# Patient Record
Sex: MALE | Race: WHITE | NOT HISPANIC OR LATINO | Employment: OTHER | URBAN - METROPOLITAN AREA
[De-identification: names, ages, dates, MRNs, and addresses within clinical notes are randomized per-mention and may not be internally consistent; named-entity substitution may affect disease eponyms.]

---

## 2017-07-27 ENCOUNTER — HOSPITAL ENCOUNTER (OUTPATIENT)
Dept: RADIOLOGY | Facility: MEDICAL CENTER | Age: 77
End: 2017-07-27
Attending: CLINICAL NURSE SPECIALIST
Payer: MEDICARE

## 2017-07-27 DIAGNOSIS — M54.16 LUMBAR RADICULOPATHY: ICD-10-CM

## 2017-07-27 PROCEDURE — 72110 X-RAY EXAM L-2 SPINE 4/>VWS: CPT

## 2017-07-27 PROCEDURE — 72148 MRI LUMBAR SPINE W/O DYE: CPT

## 2017-09-05 ENCOUNTER — APPOINTMENT (OUTPATIENT)
Dept: RADIOLOGY | Facility: MEDICAL CENTER | Age: 77
End: 2017-09-05
Attending: NEUROLOGICAL SURGERY
Payer: MEDICARE

## 2017-09-05 DIAGNOSIS — G60.9 HEREDITARY PERIPHERAL NEUROPATHY(356.0): ICD-10-CM

## 2017-09-05 PROCEDURE — 72146 MRI CHEST SPINE W/O DYE: CPT

## 2017-09-05 PROCEDURE — 72141 MRI NECK SPINE W/O DYE: CPT

## 2017-10-05 ENCOUNTER — HOSPITAL ENCOUNTER (OUTPATIENT)
Dept: LAB | Facility: MEDICAL CENTER | Age: 77
End: 2017-10-05
Attending: PSYCHIATRY & NEUROLOGY
Payer: MEDICARE

## 2017-10-05 LAB
ALBUMIN SERPL BCP-MCNC: 3.9 G/DL (ref 3.2–4.9)
ALBUMIN/GLOB SERPL: 1.3 G/DL
ALP SERPL-CCNC: 40 U/L (ref 30–99)
ALT SERPL-CCNC: 12 U/L (ref 2–50)
ANION GAP SERPL CALC-SCNC: 7 MMOL/L (ref 0–11.9)
AST SERPL-CCNC: 17 U/L (ref 12–45)
BASOPHILS # BLD AUTO: 1.2 % (ref 0–1.8)
BASOPHILS # BLD: 0.06 K/UL (ref 0–0.12)
BILIRUB SERPL-MCNC: 1 MG/DL (ref 0.1–1.5)
BUN SERPL-MCNC: 16 MG/DL (ref 8–22)
CALCIUM SERPL-MCNC: 9.2 MG/DL (ref 8.5–10.5)
CHLORIDE SERPL-SCNC: 104 MMOL/L (ref 96–112)
CK SERPL-CCNC: 127 U/L (ref 0–154)
CO2 SERPL-SCNC: 26 MMOL/L (ref 20–33)
CREAT SERPL-MCNC: 1.2 MG/DL (ref 0.5–1.4)
EOSINOPHIL # BLD AUTO: 0.2 K/UL (ref 0–0.51)
EOSINOPHIL NFR BLD: 3.9 % (ref 0–6.9)
ERYTHROCYTE [DISTWIDTH] IN BLOOD BY AUTOMATED COUNT: 42.1 FL (ref 35.9–50)
ERYTHROCYTE [SEDIMENTATION RATE] IN BLOOD BY WESTERGREN METHOD: 12 MM/HOUR (ref 0–20)
FOLATE SERPL-MCNC: >23.2 NG/ML
GFR SERPL CREATININE-BSD FRML MDRD: 58 ML/MIN/1.73 M 2
GLOBULIN SER CALC-MCNC: 3 G/DL (ref 1.9–3.5)
GLUCOSE SERPL-MCNC: 84 MG/DL (ref 65–99)
HCT VFR BLD AUTO: 39.8 % (ref 42–52)
HGB BLD-MCNC: 14 G/DL (ref 14–18)
IMM GRANULOCYTES # BLD AUTO: 0.01 K/UL (ref 0–0.11)
IMM GRANULOCYTES NFR BLD AUTO: 0.2 % (ref 0–0.9)
LYMPHOCYTES # BLD AUTO: 1.73 K/UL (ref 1–4.8)
LYMPHOCYTES NFR BLD: 33.3 % (ref 22–41)
MCH RBC QN AUTO: 32 PG (ref 27–33)
MCHC RBC AUTO-ENTMCNC: 35.2 G/DL (ref 33.7–35.3)
MCV RBC AUTO: 91.1 FL (ref 81.4–97.8)
MONOCYTES # BLD AUTO: 0.53 K/UL (ref 0–0.85)
MONOCYTES NFR BLD AUTO: 10.2 % (ref 0–13.4)
NEUTROPHILS # BLD AUTO: 2.66 K/UL (ref 1.82–7.42)
NEUTROPHILS NFR BLD: 51.2 % (ref 44–72)
NRBC # BLD AUTO: 0 K/UL
NRBC BLD AUTO-RTO: 0 /100 WBC
PLATELET # BLD AUTO: 236 K/UL (ref 164–446)
PMV BLD AUTO: 9.7 FL (ref 9–12.9)
POTASSIUM SERPL-SCNC: 4.4 MMOL/L (ref 3.6–5.5)
PROT SERPL-MCNC: 6.9 G/DL (ref 6–8.2)
RBC # BLD AUTO: 4.37 M/UL (ref 4.7–6.1)
RHEUMATOID FACT SER IA-ACNC: <10 IU/ML (ref 0–14)
SODIUM SERPL-SCNC: 137 MMOL/L (ref 135–145)
TREPONEMA PALLIDUM IGG+IGM AB [PRESENCE] IN SERUM OR PLASMA BY IMMUNOASSAY: NON REACTIVE
URATE SERPL-MCNC: 5.4 MG/DL (ref 2.5–8.3)
VIT B12 SERPL-MCNC: 499 PG/ML (ref 211–911)
WBC # BLD AUTO: 5.2 K/UL (ref 4.8–10.8)

## 2017-10-05 PROCEDURE — 82784 ASSAY IGA/IGD/IGG/IGM EACH: CPT

## 2017-10-05 PROCEDURE — 85025 COMPLETE CBC W/AUTO DIFF WBC: CPT

## 2017-10-05 PROCEDURE — 82550 ASSAY OF CK (CPK): CPT

## 2017-10-05 PROCEDURE — 86038 ANTINUCLEAR ANTIBODIES: CPT

## 2017-10-05 PROCEDURE — 86780 TREPONEMA PALLIDUM: CPT | Mod: GA

## 2017-10-05 PROCEDURE — 80053 COMPREHEN METABOLIC PANEL: CPT

## 2017-10-05 PROCEDURE — 82607 VITAMIN B-12: CPT

## 2017-10-05 PROCEDURE — 83519 RIA NONANTIBODY: CPT

## 2017-10-05 PROCEDURE — 36415 COLL VENOUS BLD VENIPUNCTURE: CPT

## 2017-10-05 PROCEDURE — 84160 ASSAY OF PROTEIN ANY SOURCE: CPT

## 2017-10-05 PROCEDURE — 84550 ASSAY OF BLOOD/URIC ACID: CPT

## 2017-10-05 PROCEDURE — 85652 RBC SED RATE AUTOMATED: CPT

## 2017-10-05 PROCEDURE — 84156 ASSAY OF PROTEIN URINE: CPT

## 2017-10-05 PROCEDURE — 86039 ANTINUCLEAR ANTIBODIES (ANA): CPT

## 2017-10-05 PROCEDURE — 86334 IMMUNOFIX E-PHORESIS SERUM: CPT

## 2017-10-05 PROCEDURE — 84165 PROTEIN E-PHORESIS SERUM: CPT

## 2017-10-05 PROCEDURE — 86431 RHEUMATOID FACTOR QUANT: CPT

## 2017-10-05 PROCEDURE — 82746 ASSAY OF FOLIC ACID SERUM: CPT

## 2017-10-05 PROCEDURE — 83516 IMMUNOASSAY NONANTIBODY: CPT

## 2017-10-05 PROCEDURE — 82085 ASSAY OF ALDOLASE: CPT

## 2017-10-06 LAB — ALDOLASE SERPL-CCNC: 2.7 U/L (ref 1.5–8.1)

## 2017-10-07 LAB
NUCLEAR IGG SER QL IA: DETECTED
NUCLEAR IGG TITR SER IF: ABNORMAL {TITER}

## 2017-10-08 LAB
ALBUMIN SERPL-MCNC: 4.11 G/DL (ref 3.75–5.01)
ALPHA1 GLOB SERPL ELPH-MCNC: 0.26 G/DL (ref 0.19–0.46)
ALPHA2 GLOB SERPL ELPH-MCNC: 0.66 G/DL (ref 0.48–1.05)
B-GLOBULIN SERPL ELPH-MCNC: 0.84 G/DL (ref 0.48–1.1)
GAMMA GLOB SERPL ELPH-MCNC: 1.22 G/DL (ref 0.62–1.51)
IGA SERPL-MCNC: 269 MG/DL (ref 68–408)
IGG SERPL-MCNC: 1170 MG/DL (ref 768–1632)
IGM SERPL-MCNC: 78 MG/DL (ref 35–263)
INTERPRETATION SERPL IFE-IMP: NORMAL
INTERPRETATION SERPL IFE-IMP: NORMAL
PATHOLOGY STUDY: NORMAL
PROT SERPL-MCNC: 7.1 G/DL (ref 6–8.3)
PROT UR-MCNC: <4 MG/DL (ref 0–15)

## 2017-10-16 LAB
ACHR BIND AB SER-SCNC: 0 NMOL/L (ref 0–0.4)
ACHR BLOCK AB/ACHR TOTAL SFR SER: 8 % (ref 0–26)

## 2017-10-18 ENCOUNTER — HOSPITAL ENCOUNTER (OUTPATIENT)
Dept: RADIOLOGY | Facility: MEDICAL CENTER | Age: 77
End: 2017-10-18
Attending: OTOLARYNGOLOGY
Payer: MEDICARE

## 2017-10-18 DIAGNOSIS — R13.13 DYSPHAGIA, PHARYNGEAL PHASE: ICD-10-CM

## 2017-10-18 DIAGNOSIS — R49.0 DYSPHONIA: ICD-10-CM

## 2017-10-18 PROCEDURE — 74230 X-RAY XM SWLNG FUNCJ C+: CPT

## 2017-10-18 PROCEDURE — 92611 MOTION FLUOROSCOPY/SWALLOW: CPT

## 2017-10-18 PROCEDURE — G8996 SWALLOW CURRENT STATUS: HCPCS | Mod: CH

## 2017-10-18 PROCEDURE — G8997 SWALLOW GOAL STATUS: HCPCS | Mod: CH

## 2017-10-18 PROCEDURE — G8998 SWALLOW D/C STATUS: HCPCS | Mod: CH

## 2017-10-18 NOTE — PROGRESS NOTES
"[facility]  Speech-Language Pathology Department  Modified Barium Swallow Study Summary    Patient: Nathan Rose     Date of Evaluation:  10/18/2017    Referring MD: Jesus Miller M.D.     Radiologist:  Dr. Zhang    Patient History/Reason for Referral:  Pt notes long history of dysphagia and dysphonia.  He had an anterior cervical fusion and removal of cervical osteophytes about a year ago.  Pt notes improved swallow function since surgery however occasionally still chokes.  Pt notes there is \"no rhyme or reason\" for the choking.  He is on a regular diet and thin liquids.  Has some difficulty with large pills however is improving.     Procedure Results:  The examination was conducted with videofluoroscopy from a   Lateral and Anterior view.  The following textures were presented:   Applesauce, Diced Fruit, Cookie and Thin Liquid       The following observations were made:    Oral Phase Puree Thick Liquids Thin Liquids Mechanical Soft Solid   Anterior spillage        Residue in oral cavity after the swallow WFL N/A WFL WFL WFL   Decreased mastication        Loss of bolus control        Piecemeal deglutition        Slow oral transit time        Premature spillage into the valleculae        Premature spillage into the pyriform sinus           Other Observations:  Evidence of anterior cervical fusion around C4-5.  No osteophytes present.          Pharyngeal Phase Puree Thick Liquids Thin Liquids Mechanical Soft Solid   Delayed triggering of the pharyngeal swallow  N/A   WFL WFL   Absent initiation of swallow        Residue on tongue base        Residue on posterior pharyngeal wall min  min     Residue in valleculae min       Residue in pyriform sinus        Penetration        Aspiration          Other Observations: Residue clears easily with double swallow.  Slight decreased PPW stripping at area of surgery. Slight slow/sluggish epiglottal inversion however is complete.                         "     Esophageal Phase:  A-P view completed with puree and thin.  No overt deficits noted.                                 Impressions:  Oral-pharyngeal phase of swallow is within functional limits.  Slight posterior pharyngeal wall weakness at area of hardware from cervical fusion.  Residue clears easily with double swallow.  Slight slow/sluggish epiglottal inversion however is complete.         Recommendations: Diet: Regular        Liquid:  Thin                                        Medications:  As tolerated.  Floated whole in puree or with thick liquids may be more effective.         Compensatory Strategies:   1.  Double swallow as needed, especially with sticker foods   2.  Alternate bites/sips as needed   3.  Slow rate of intake            Medicare only:                     Thank you for the referral.    For further questions, please call [phone].        Irma Mohan MA, CCC-SLP   Speech-Language Pathologist

## 2017-10-24 NOTE — ADDENDUM NOTE
Encounter addended by: Katie Winters P.T. on: 10/24/2017 12:01 PM<BR>    Actions taken: Charge Capture section accepted

## 2018-03-26 ENCOUNTER — HOSPITAL ENCOUNTER (OUTPATIENT)
Dept: HOSPITAL 8 - CVU | Age: 78
Discharge: HOME | End: 2018-03-26
Attending: INTERNAL MEDICINE
Payer: MEDICARE

## 2018-03-26 DIAGNOSIS — R07.89: Primary | ICD-10-CM

## 2018-03-26 DIAGNOSIS — R06.02: ICD-10-CM

## 2018-03-26 PROCEDURE — 78452 HT MUSCLE IMAGE SPECT MULT: CPT

## 2018-03-26 PROCEDURE — A9502 TC99M TETROFOSMIN: HCPCS

## 2018-03-26 PROCEDURE — 93017 CV STRESS TEST TRACING ONLY: CPT

## 2018-03-26 PROCEDURE — 75571 CT HRT W/O DYE W/CA TEST: CPT

## 2018-03-26 PROCEDURE — 93306 TTE W/DOPPLER COMPLETE: CPT

## 2018-10-29 ENCOUNTER — APPOINTMENT (OUTPATIENT)
Dept: ADMISSIONS | Facility: MEDICAL CENTER | Age: 78
End: 2018-10-29
Attending: UROLOGY
Payer: MEDICARE

## 2018-10-29 DIAGNOSIS — Z01.810 PRE-OPERATIVE CARDIOVASCULAR EXAMINATION: ICD-10-CM

## 2018-10-29 DIAGNOSIS — Z01.812 PRE-OPERATIVE LABORATORY EXAMINATION: ICD-10-CM

## 2018-10-29 LAB
APPEARANCE UR: CLEAR
BILIRUB UR QL STRIP.AUTO: NEGATIVE
COLOR UR: YELLOW
EKG IMPRESSION: NORMAL
GLUCOSE UR STRIP.AUTO-MCNC: NEGATIVE MG/DL
KETONES UR STRIP.AUTO-MCNC: NEGATIVE MG/DL
LEUKOCYTE ESTERASE UR QL STRIP.AUTO: NEGATIVE
MICRO URNS: NORMAL
NITRITE UR QL STRIP.AUTO: NEGATIVE
PH UR STRIP.AUTO: 7 [PH]
PROT UR QL STRIP: NEGATIVE MG/DL
RBC UR QL AUTO: NEGATIVE
SP GR UR STRIP.AUTO: 1.01
UROBILINOGEN UR STRIP.AUTO-MCNC: 0.2 MG/DL

## 2018-10-29 PROCEDURE — 87086 URINE CULTURE/COLONY COUNT: CPT

## 2018-10-29 PROCEDURE — 81003 URINALYSIS AUTO W/O SCOPE: CPT

## 2018-10-31 LAB
BACTERIA UR CULT: NORMAL
SIGNIFICANT IND 70042: NORMAL
SITE SITE: NORMAL
SOURCE SOURCE: NORMAL

## 2018-11-08 ENCOUNTER — HOSPITAL ENCOUNTER (OUTPATIENT)
Facility: MEDICAL CENTER | Age: 78
End: 2018-11-09
Attending: UROLOGY | Admitting: UROLOGY
Payer: MEDICARE

## 2018-11-08 PROCEDURE — 160036 HCHG PACU - EA ADDL 30 MINS PHASE I: Performed by: UROLOGY

## 2018-11-08 PROCEDURE — 700101 HCHG RX REV CODE 250: Performed by: UROLOGY

## 2018-11-08 PROCEDURE — A9270 NON-COVERED ITEM OR SERVICE: HCPCS

## 2018-11-08 PROCEDURE — A9270 NON-COVERED ITEM OR SERVICE: HCPCS | Performed by: UROLOGY

## 2018-11-08 PROCEDURE — 700102 HCHG RX REV CODE 250 W/ 637 OVERRIDE(OP): Performed by: ANESTHESIOLOGY

## 2018-11-08 PROCEDURE — 700101 HCHG RX REV CODE 250

## 2018-11-08 PROCEDURE — 700102 HCHG RX REV CODE 250 W/ 637 OVERRIDE(OP): Performed by: UROLOGY

## 2018-11-08 PROCEDURE — 500879 HCHG PACK, CYSTO: Performed by: UROLOGY

## 2018-11-08 PROCEDURE — 700111 HCHG RX REV CODE 636 W/ 250 OVERRIDE (IP): Performed by: UROLOGY

## 2018-11-08 PROCEDURE — A9270 NON-COVERED ITEM OR SERVICE: HCPCS | Performed by: ANESTHESIOLOGY

## 2018-11-08 PROCEDURE — 700111 HCHG RX REV CODE 636 W/ 250 OVERRIDE (IP)

## 2018-11-08 PROCEDURE — A4346 CATH INDW FOLEY 3 WAY: HCPCS | Performed by: UROLOGY

## 2018-11-08 PROCEDURE — 160048 HCHG OR STATISTICAL LEVEL 1-5: Performed by: UROLOGY

## 2018-11-08 PROCEDURE — 501329 HCHG SET, CYSTO IRRIG Y TUR: Performed by: UROLOGY

## 2018-11-08 PROCEDURE — 160041 HCHG SURGERY MINUTES - EA ADDL 1 MIN LEVEL 4: Performed by: UROLOGY

## 2018-11-08 PROCEDURE — 700102 HCHG RX REV CODE 250 W/ 637 OVERRIDE(OP)

## 2018-11-08 PROCEDURE — 160009 HCHG ANES TIME/MIN: Performed by: UROLOGY

## 2018-11-08 PROCEDURE — 160002 HCHG RECOVERY MINUTES (STAT): Performed by: UROLOGY

## 2018-11-08 PROCEDURE — G0378 HOSPITAL OBSERVATION PER HR: HCPCS

## 2018-11-08 PROCEDURE — 160035 HCHG PACU - 1ST 60 MINS PHASE I: Performed by: UROLOGY

## 2018-11-08 PROCEDURE — A4357 BEDSIDE DRAINAGE BAG: HCPCS | Performed by: UROLOGY

## 2018-11-08 PROCEDURE — 160029 HCHG SURGERY MINUTES - 1ST 30 MINS LEVEL 4: Performed by: UROLOGY

## 2018-11-08 RX ORDER — DIPHENHYDRAMINE HCL 25 MG
25 TABLET ORAL EVERY 8 HOURS PRN
Status: DISCONTINUED | OUTPATIENT
Start: 2018-11-08 | End: 2018-11-08

## 2018-11-08 RX ORDER — HALOPERIDOL 5 MG/ML
1 INJECTION INTRAMUSCULAR
Status: DISCONTINUED | OUTPATIENT
Start: 2018-11-08 | End: 2018-11-08 | Stop reason: HOSPADM

## 2018-11-08 RX ORDER — OXYCODONE HYDROCHLORIDE 5 MG/1
2.5 TABLET ORAL
Status: DISCONTINUED | OUTPATIENT
Start: 2018-11-08 | End: 2018-11-09 | Stop reason: HOSPADM

## 2018-11-08 RX ORDER — ACETAMINOPHEN 500 MG
1000 TABLET ORAL ONCE
Status: COMPLETED | OUTPATIENT
Start: 2018-11-08 | End: 2018-11-08

## 2018-11-08 RX ORDER — DEXAMETHASONE SODIUM PHOSPHATE 4 MG/ML
4 INJECTION, SOLUTION INTRA-ARTICULAR; INTRALESIONAL; INTRAMUSCULAR; INTRAVENOUS; SOFT TISSUE
Status: DISCONTINUED | OUTPATIENT
Start: 2018-11-08 | End: 2018-11-09 | Stop reason: HOSPADM

## 2018-11-08 RX ORDER — DEXTROSE MONOHYDRATE, SODIUM CHLORIDE, AND POTASSIUM CHLORIDE 50; 1.49; 4.5 G/1000ML; G/1000ML; G/1000ML
INJECTION, SOLUTION INTRAVENOUS CONTINUOUS
Status: DISCONTINUED | OUTPATIENT
Start: 2018-11-08 | End: 2018-11-09 | Stop reason: HOSPADM

## 2018-11-08 RX ORDER — ONDANSETRON 2 MG/ML
4 INJECTION INTRAMUSCULAR; INTRAVENOUS EVERY 4 HOURS PRN
Status: DISCONTINUED | OUTPATIENT
Start: 2018-11-08 | End: 2018-11-09 | Stop reason: HOSPADM

## 2018-11-08 RX ORDER — ATROPA BELLADONNA AND OPIUM 16.2; 6 MG/1; MG/1
SUPPOSITORY RECTAL
Status: DISCONTINUED | OUTPATIENT
Start: 2018-11-08 | End: 2018-11-08 | Stop reason: HOSPADM

## 2018-11-08 RX ORDER — OMEPRAZOLE 20 MG/1
20 CAPSULE, DELAYED RELEASE ORAL DAILY
Status: DISCONTINUED | OUTPATIENT
Start: 2018-11-08 | End: 2018-11-09 | Stop reason: HOSPADM

## 2018-11-08 RX ORDER — MAGNESIUM HYDROXIDE 1200 MG/15ML
LIQUID ORAL
Status: COMPLETED | OUTPATIENT
Start: 2018-11-08 | End: 2018-11-08

## 2018-11-08 RX ORDER — TRAMADOL HYDROCHLORIDE 50 MG/1
50 TABLET ORAL
Status: DISCONTINUED | OUTPATIENT
Start: 2018-11-08 | End: 2018-11-08 | Stop reason: HOSPADM

## 2018-11-08 RX ORDER — SODIUM CHLORIDE, SODIUM LACTATE, POTASSIUM CHLORIDE, CALCIUM CHLORIDE 600; 310; 30; 20 MG/100ML; MG/100ML; MG/100ML; MG/100ML
INJECTION, SOLUTION INTRAVENOUS ONCE
Status: COMPLETED | OUTPATIENT
Start: 2018-11-08 | End: 2018-11-08

## 2018-11-08 RX ORDER — DIPHENHYDRAMINE HCL 25 MG
25 TABLET ORAL EVERY 6 HOURS PRN
Status: DISCONTINUED | OUTPATIENT
Start: 2018-11-08 | End: 2018-11-09 | Stop reason: HOSPADM

## 2018-11-08 RX ORDER — HALOPERIDOL 5 MG/ML
1 INJECTION INTRAMUSCULAR EVERY 6 HOURS PRN
Status: DISCONTINUED | OUTPATIENT
Start: 2018-11-08 | End: 2018-11-09 | Stop reason: HOSPADM

## 2018-11-08 RX ORDER — ACETAMINOPHEN 500 MG
1000 TABLET ORAL EVERY 6 HOURS
Status: DISCONTINUED | OUTPATIENT
Start: 2018-11-08 | End: 2018-11-09 | Stop reason: HOSPADM

## 2018-11-08 RX ORDER — MEPERIDINE HYDROCHLORIDE 25 MG/ML
12.5 INJECTION INTRAMUSCULAR; INTRAVENOUS; SUBCUTANEOUS
Status: DISCONTINUED | OUTPATIENT
Start: 2018-11-08 | End: 2018-11-08 | Stop reason: HOSPADM

## 2018-11-08 RX ORDER — CELECOXIB 200 MG/1
200 CAPSULE ORAL ONCE
Status: COMPLETED | OUTPATIENT
Start: 2018-11-08 | End: 2018-11-08

## 2018-11-08 RX ORDER — SIMVASTATIN 10 MG
20 TABLET ORAL NIGHTLY
Status: DISCONTINUED | OUTPATIENT
Start: 2018-11-08 | End: 2018-11-09 | Stop reason: HOSPADM

## 2018-11-08 RX ORDER — SCOLOPAMINE TRANSDERMAL SYSTEM 1 MG/1
1 PATCH, EXTENDED RELEASE TRANSDERMAL
Status: DISCONTINUED | OUTPATIENT
Start: 2018-11-08 | End: 2018-11-09 | Stop reason: HOSPADM

## 2018-11-08 RX ORDER — ONDANSETRON 2 MG/ML
4 INJECTION INTRAMUSCULAR; INTRAVENOUS
Status: DISCONTINUED | OUTPATIENT
Start: 2018-11-08 | End: 2018-11-08 | Stop reason: HOSPADM

## 2018-11-08 RX ORDER — SODIUM CHLORIDE, SODIUM LACTATE, POTASSIUM CHLORIDE, CALCIUM CHLORIDE 600; 310; 30; 20 MG/100ML; MG/100ML; MG/100ML; MG/100ML
INJECTION, SOLUTION INTRAVENOUS CONTINUOUS
Status: DISCONTINUED | OUTPATIENT
Start: 2018-11-08 | End: 2018-11-08 | Stop reason: HOSPADM

## 2018-11-08 RX ADMIN — POTASSIUM CHLORIDE, DEXTROSE MONOHYDRATE AND SODIUM CHLORIDE: 150; 5; 450 INJECTION, SOLUTION INTRAVENOUS at 16:38

## 2018-11-08 RX ADMIN — ACETAMINOPHEN 1000 MG: 500 TABLET ORAL at 16:54

## 2018-11-08 RX ADMIN — OMEPRAZOLE 20 MG: 20 CAPSULE, DELAYED RELEASE ORAL at 16:54

## 2018-11-08 RX ADMIN — LIDOCAINE HYDROCHLORIDE 0.5 ML: 10 INJECTION, SOLUTION EPIDURAL; INFILTRATION; INTRACAUDAL; PERINEURAL at 10:10

## 2018-11-08 RX ADMIN — SIMVASTATIN 20 MG: 10 TABLET, FILM COATED ORAL at 19:57

## 2018-11-08 RX ADMIN — SODIUM CHLORIDE, SODIUM LACTATE, POTASSIUM CHLORIDE, CALCIUM CHLORIDE: 600; 310; 30; 20 INJECTION, SOLUTION INTRAVENOUS at 10:10

## 2018-11-08 RX ADMIN — CELECOXIB 200 MG: 200 CAPSULE ORAL at 09:58

## 2018-11-08 RX ADMIN — ACETAMINOPHEN 1000 MG: 500 TABLET, FILM COATED ORAL at 09:58

## 2018-11-08 ASSESSMENT — COGNITIVE AND FUNCTIONAL STATUS - GENERAL
MOBILITY SCORE: 24
SUGGESTED CMS G CODE MODIFIER DAILY ACTIVITY: CH
DAILY ACTIVITIY SCORE: 24
SUGGESTED CMS G CODE MODIFIER MOBILITY: CH

## 2018-11-08 ASSESSMENT — LIFESTYLE VARIABLES
AVERAGE NUMBER OF DAYS PER WEEK YOU HAVE A DRINK CONTAINING ALCOHOL: 1
EVER FELT BAD OR GUILTY ABOUT YOUR DRINKING: NO
TOTAL SCORE: 0
EVER_SMOKED: NEVER
TOTAL SCORE: 0
CONSUMPTION TOTAL: NEGATIVE
EVER HAD A DRINK FIRST THING IN THE MORNING TO STEADY YOUR NERVES TO GET RID OF A HANGOVER: NO
ALCOHOL_USE: YES
HAVE PEOPLE ANNOYED YOU BY CRITICIZING YOUR DRINKING: NO
HAVE YOU EVER FELT YOU SHOULD CUT DOWN ON YOUR DRINKING: NO
TOTAL SCORE: 0
ON A TYPICAL DAY WHEN YOU DRINK ALCOHOL HOW MANY DRINKS DO YOU HAVE: 1
HOW MANY TIMES IN THE PAST YEAR HAVE YOU HAD 5 OR MORE DRINKS IN A DAY: 0

## 2018-11-08 ASSESSMENT — PAIN SCALES - GENERAL
PAINLEVEL_OUTOF10: 0

## 2018-11-08 ASSESSMENT — PATIENT HEALTH QUESTIONNAIRE - PHQ9
2. FEELING DOWN, DEPRESSED, IRRITABLE, OR HOPELESS: NOT AT ALL
1. LITTLE INTEREST OR PLEASURE IN DOING THINGS: NOT AT ALL
SUM OF ALL RESPONSES TO PHQ9 QUESTIONS 1 AND 2: 0

## 2018-11-08 NOTE — OR NURSING
Report given to LESLIE Kiran. Pt via ila, accompanied by transport, was transferred to [facility] at 1554. All personal belongings sent with Pt.

## 2018-11-08 NOTE — OP REPORT
DATE OF SERVICE:  11/08/2018    NAME OF OPERATION:  GreenLight photoselective vaporization of the prostate.    PREOPERATIVE DIAGNOSIS:  Benign prostatic hypertrophy with obstruction.    POSTOPERATIVE DIAGNOSIS:  Benign prostatic hypertrophy with obstruction.    PRIMARY SURGEON:  Grey Espinoza MD    ANESTHESIOLOGIST:  Binta Carmona MD    FINDINGS:  Trilobar especially median lobe hypertrophy of the prostate, 78 g.    INDICATIONS:  Briefly, the patient is a 78-year-old gentleman with a history   of severe obstructive urinary symptoms.  Upon consideration of options, he   elected to undergo a GreenLight PVP.  Informed consent has been obtained.    OPERATION IN DETAIL:  The patient was taken to the operating room, placed on   the operating table in supine position.  After administration of general   anesthesia, he was placed in lithotomy.  Genitals were prepped and draped   sterilely.  No sounds were used to calibrate the urethra to 28 Filipino without   difficulty.  A 22-Filipino cystoscope was then passed in the bladder with   visualizing obturator.  Prostatic urethra was completely obstructed.  In the   bladder, there was 1+ trabeculations.  Both ureteral orifices were identified   in the normal anatomic position, though somewhat close to the encroachment of   median lobe intravesically.    The laser bridge was employed and a GreenLight XPS fiber was utilized to   create a trough at the 5 and 7 o'clock position of the bladder neck back to   the level of the verumontanum using 80 forbes of energy.  This created   excellent isolation of the median lobe and the posterior capsular fibers were   identified to the level of the verumontanum moving retrograde to the bladder   neck.  With the median lobe and complete isolation, higher energy settings   were used to vaporize this down to the surgical capsule.    Trough was then created at the 10 o'clock position, the lateral lobe of the   right prostate beginning at  the bladder neck and back to the apex of the   prostate.  With this created to the lateral aspect of the capsule, the   resultant hanging lateral lobe was then completely vaporized down to what   appeared to be near the capsule.  The same was performed on the contralateral   side.  At the conclusion of this, there was a significant cavity created   through the entire prostate gland with a wide open bladder neck.  Spot cautery   was used to obtain better hemostasis.    Prostate laser debris was then irrigated free of the bladder.  At low pressure   filling, there was no evidence of significant bleeding as well as there was   maintained continue wide open prostatic fossa.  Lasering was not carried past   the verumontanum nor the ureteral orifices were involved.  Bladder was left   partially full and a 22-Uzbek 3-way catheter passed easily into the bladder   draining light pink irrigant at the case concluded.    He will be admitted overnight for observation and the use of continuous   bladder irrigation and for pain control.  Patient tolerated procedure well and   was taken to recovery room in stable condition.       ____________________________________     Grey Espinoza MD MCM / JOSH    DD:  11/08/2018 11:57:37  DT:  11/08/2018 12:12:34    D#:  [id number]  Job#:  [id number]

## 2018-11-08 NOTE — OR SURGEON
Immediate Post OP Note    PreOp Diagnosis: BPH with obstruction    PostOp Diagnosis: same    Procedure(s):  TURP-VAPOR - GREENLIGHT LASER - Wound Class: Clean Contaminated    Surgeon(s):  Grey sEpinoza M.D.    Anesthesiologist/Type of Anesthesia:  Anesthesiologist: Binta Carmona M.D./General    Surgical Staff:  Circulator: Christy Sheets R.N.; Guerline Singh R.N.  Relief Circulator: Armando Paulson R.N.  Relief Scrub: Khadijah Chandler  Scrub Person: Bk Kim C.N.A.; Brandon Chilel    Specimens removed if any:  * No specimens in log *    Estimated Blood Loss: min    Findings: trilobar, jeff median lobe, hypertrophy    Complications: none        11/8/2018 11:50 AM Grey Espinoza M.D.

## 2018-11-08 NOTE — OR NURSING
Pt AA/Ox4. VSS. Intact 3-way murray catheter with CBI. Pt denies pain or need for pain medications at this time. Pt denies numbness or tingling. No nausea or vomiting. SCDs in place. Pt's wife, Dara, updated. Awaiting room to be ready. Will continue to monitor closely.

## 2018-11-09 VITALS
TEMPERATURE: 97.9 F | RESPIRATION RATE: 16 BRPM | DIASTOLIC BLOOD PRESSURE: 69 MMHG | WEIGHT: 202.6 LBS | OXYGEN SATURATION: 97 % | SYSTOLIC BLOOD PRESSURE: 132 MMHG | HEIGHT: 77 IN | BODY MASS INDEX: 23.92 KG/M2 | HEART RATE: 59 BPM

## 2018-11-09 PROCEDURE — A9270 NON-COVERED ITEM OR SERVICE: HCPCS | Performed by: UROLOGY

## 2018-11-09 PROCEDURE — 700102 HCHG RX REV CODE 250 W/ 637 OVERRIDE(OP): Performed by: UROLOGY

## 2018-11-09 PROCEDURE — G0378 HOSPITAL OBSERVATION PER HR: HCPCS

## 2018-11-09 RX ADMIN — ACETAMINOPHEN 1000 MG: 500 TABLET ORAL at 09:01

## 2018-11-09 RX ADMIN — OMEPRAZOLE 20 MG: 20 CAPSULE, DELAYED RELEASE ORAL at 04:37

## 2018-11-09 ASSESSMENT — PAIN SCALES - GENERAL
PAINLEVEL_OUTOF10: 0

## 2018-11-09 NOTE — DISCHARGE INSTRUCTIONS
Discharge Instructions    Discharged to home by car with relative. Discharged via wheelchair, hospital escort: Yes.  Special equipment needed: Not Applicable    Be sure to schedule a follow-up appointment with your primary care doctor or any specialists as instructed.     Discharge Plan:   Diet Plan: Discussed  Activity Level: Discussed  Confirmed Follow up Appointment: Patient to Call and Schedule Appointment  Confirmed Symptoms Management: Discussed  Medication Reconciliation Updated: Yes  Pneumococcal Vaccine Administered/Refused: Not given - Patient refused pneumococcal vaccine  Influenza Vaccine Indication: Not indicated: Previously immunized this influenza season and > 8 years of age    I understand that a diet low in cholesterol, fat, and sodium is recommended for good health. Unless I have been given specific instructions below for another diet, I accept this instruction as my diet prescription.   Other diet: Regular    Special Instructions: None    · Is patient discharged on Warfarin / Coumadin?   No     Green Light Laser Prostate Treatment    Green light laser therapy is a procedure that uses a special high-energy laser for vaporizing extra prostate tissue. It is less invasive than traditional methods of prostate surgery, which involve cutting out the prostate tissue. Because the tissue is vaporized rather than cut out there is generally less blood loss.    LET YOUR HEALTH CARE PROVIDER KNOW ABOUT:  · Any allergies you have.  · Any medicines you are taking, including vitamins, herbs, eye drops, creams, and over-the-counter medication.  · Previous problems you or members of your family have had with the use of anesthetics.  · Any blood disorders you have.  · Previous surgeries you have had.  · Medical conditions you have.  ·   RISKS AND COMPLICATIONS  Generally, green light laser prostate treatment is a safe procedure. However, as with any procedure, complications can occur. Possible complications  include:  · Urinary tract infection.  · Erectile dysfunction (rare).  · Dry ejaculation--Semen is not released when you reach sexual climax.  · Scar tissue in the urinary passage.  ·   BEFORE THE PROCEDURE   · Your health care provider may discuss medicines you are taking and may advise you to stop taking specific ones.  · You may be given antibiotic medicine to take as a precaution against bacterial infection.  · Do not eat or drink anything for 8 hours before your procedure or as directed by your health care provider. You may have a sip of water to take any necessary medicines.  ·   PROCEDURE  Depending on the size and shape of your prostate, the procedure may take 30-60 minutes. You will be given one of the following:   · A medicine that makes you go to sleep (general anesthetic).  · A medicine injected into your spine that numbs your body below the waist (spinal anesthetic). Sedation is usually given with spinal anesthetic so you will be relaxed.  A tube containing viewing scopes and instruments will be inserted through your penis so that no cuts (incisions) are needed. A thin fiber is put through the tube and positioned next to the excess prostate tissue. Pulses of laser light come from the end of the fiber and are projected onto the excess tissue. The laser beam is absorbed by your blood, which becomes hot enough to vaporize the excess prostate tissue. This laser beam will seal off the blood vessels, decreasing bleeding. The tube with the viewing scopes, instruments, and thin fiber will be removed and replaced with a temporary catheter.    AFTER THE PROCEDURE   After the surgery, you will be sent to the recovery room for a short time. Depending on factors such as the amount of prostate tissue vaporized, the strength of your bladder, and the amount of bleeding expected, the catheter may be removed. Generally, overnight stay is not needed and you will be sent home on the same day as the procedure. You may be  sent home with elastic support stockings to help prevent blood clots in your legs.     This information is not intended to replace advice given to you by your health care provider. Make sure you discuss any questions you have with your health care provider.    Document Released: 03/26/2009 Document Revised: 12/23/2014 Document Reviewed: 06/09/2014  [employer] Patient Education © 2017 [employer]      Depression / Suicide Risk    As you are discharged from this [facility] facility, it is important to learn how to keep safe from harming yourself.    Recognize the warning signs:  · Abrupt changes in personality, positive or negative- including increase in energy   · Giving away possessions  · Change in eating patterns- significant weight changes-  positive or negative  · Change in sleeping patterns- unable to sleep or sleeping all the time   · Unwillingness or inability to communicate  · Depression  · Unusual sadness, discouragement and loneliness  · Talk of wanting to die  · Neglect of personal appearance   · Rebelliousness- reckless behavior  · Withdrawal from people/activities they love  · Confusion- inability to concentrate     If you or a loved one observes any of these behaviors or has concerns about self-harm, here's what you can do:  · Talk about it- your feelings and reasons for harming yourself  · Remove any means that you might use to hurt yourself (examples: pills, rope, extension cords, firearm)  · Get professional help from the community (Mental Health, Substance Abuse, psychological counseling)  · Do not be alone:Call your Safe Contact- someone whom you trust who will be there for you.  · Call your local CRISIS HOTLINE 192-1246 or 561-741-9099  · Call your local Children's Mobile Crisis Response Team [place] (311) 375-5737 or www.[employer]  · Call the toll free National Suicide Prevention Hotlines   · National Suicide Prevention Lifeline 555-942-WBJK (5331)  · National Hope  Line Network 800-SUICIDE (141-8269)

## 2018-11-09 NOTE — CARE PLAN
Problem: Safety  Goal: Will remain free from injury  Outcome: PROGRESSING AS EXPECTED  Bed low and locked, call light and belongings in reach, hourly rounding in place, pt calls appropriately for assistance    Problem: Urinary Elimination:  Goal: Ability to reestablish a normal urinary elimination pattern will improve  Outcome: PROGRESSING AS EXPECTED  CBI and murray removed  Voiding trial in place

## 2018-11-09 NOTE — CARE PLAN
Problem: Communication  Goal: The ability to communicate needs accurately and effectively will improve  Outcome: PROGRESSING AS EXPECTED  Plan of care discussed with patient at beginning of shift.     Problem: Safety  Goal: Will remain free from injury  Outcome: PROGRESSING AS EXPECTED  Patient A&O x4. Bed in lowest position and locked. Frequent rounding in place. Call light within reach.

## 2018-11-09 NOTE — PROGRESS NOTES
Discharge teaching done.  PIV removed    Discharging Patient home per physician order.      Discharged with wife.      Demonstrated understanding of discharge instructions, follow up appointments, home medications, prescriptions, home care for surgical wound.      Ambulating without assistance, voiding without difficulty, pain well controlled, tolerating oral medications, oxygen saturation greater than 90% , tolerating diet.      Educational handouts given and discussed.      Verbalized understanding of discharge instructions and educational handouts.      All questions answered.      Belongings with patient at time of discharge.

## 2018-11-09 NOTE — PROGRESS NOTES
Update received from PACU RN.   Pt arrived to unit by ila and was able to walk to unit bed.     2 RN skin check conducted with LESLIE Lora.   Abrasion present on right posterior ear, left elbow and wrist.   Callous present on right great toe and vericose veins present BLE.     CBI present and running at moderate rate. Pt received remaining 300mL of irrigation bag. UO present in murray bag has changed from clear yellow to pink.   CBI is secured with statlock on left thigh.     Pt denies pain.   Pt is A/O X 4. 2L O2 by cannula on arrvival. No O2 use at home. No mobility issues at home.     PIV is now running  D5 1/2NS w/20 K at 100mL/hr.     Call light at bedside.

## 2018-11-09 NOTE — PROGRESS NOTES
Murray clamped, 200 ml NS instilled into bladder, 3 way murray removed per MD order. Pt educated on need to void within 6 hours of removal. Urinal provided.

## 2018-11-09 NOTE — PROGRESS NOTES
"Urology Progress Note    Post op Day # 1. Greenlight PVP    Overnight Events: None    S: No fevers, chills, nausea or vomiting.  Murray out. Waiting for him to void. No complaints.    O:   Blood pressure 132/69, pulse (!) 59, temperature 36.6 °C (97.9 °F), resp. rate 16, height 1.956 m (6' 5\"), weight 91.9 kg (202 lb 9.6 oz), SpO2 97 %.              Intake/Output Summary (Last 24 hours) at 11/09/18 1122  Last data filed at 11/09/18 0910   Gross per 24 hour   Intake            79211 ml   Output            32335 ml   Net            -2065 ml       Exam:  Abdomen soft, benign   Urine: pink with murray catheter      A/P:  There are no active hospital problems to display for this patient.      Stable.   Ambulate, IS.  DC home today  F/U Dr Espinoza 4 weeks  "

## 2018-11-09 NOTE — CARE PLAN
Problem: Safety  Goal: Will remain free from injury  Outcome: PROGRESSING AS EXPECTED      Problem: Venous Thromboembolism (VTW)/Deep Vein Thrombosis (DVT) Prevention:  Goal: Patient will participate in Venous Thrombosis (VTE)/Deep Vein Thrombosis (DVT)Prevention Measures  Outcome: PROGRESSING AS EXPECTED      Problem: Urinary Elimination:  Goal: Ability to reestablish a normal urinary elimination pattern will improve  Outcome: PROGRESSING AS EXPECTED

## 2018-12-11 ENCOUNTER — HOSPITAL ENCOUNTER (OUTPATIENT)
Dept: HOSPITAL 8 - CFH | Age: 78
Discharge: HOME | End: 2018-12-11
Attending: INTERNAL MEDICINE
Payer: COMMERCIAL

## 2018-12-11 DIAGNOSIS — I08.0: ICD-10-CM

## 2018-12-11 DIAGNOSIS — R07.9: ICD-10-CM

## 2018-12-11 DIAGNOSIS — R06.02: ICD-10-CM

## 2018-12-11 DIAGNOSIS — I25.10: ICD-10-CM

## 2018-12-11 DIAGNOSIS — I10: Primary | ICD-10-CM

## 2018-12-11 PROCEDURE — 93306 TTE W/DOPPLER COMPLETE: CPT

## 2019-02-04 ENCOUNTER — TELEPHONE (OUTPATIENT)
Dept: CARDIOLOGY | Facility: MEDICAL CENTER | Age: 79
End: 2019-02-04

## 2019-06-06 ENCOUNTER — OFFICE VISIT (OUTPATIENT)
Dept: CARDIOLOGY | Facility: MEDICAL CENTER | Age: 79
End: 2019-06-06
Payer: COMMERCIAL

## 2019-06-06 VITALS
HEART RATE: 74 BPM | SYSTOLIC BLOOD PRESSURE: 120 MMHG | DIASTOLIC BLOOD PRESSURE: 76 MMHG | HEIGHT: 77 IN | BODY MASS INDEX: 24.44 KG/M2 | WEIGHT: 207 LBS | OXYGEN SATURATION: 95 %

## 2019-06-06 DIAGNOSIS — I25.119 CORONARY ARTERY DISEASE WITH ANGINA PECTORIS, UNSPECIFIED VESSEL OR LESION TYPE, UNSPECIFIED WHETHER NATIVE OR TRANSPLANTED HEART (HCC): ICD-10-CM

## 2019-06-06 DIAGNOSIS — I25.10 CORONARY ARTERY CALCIFICATION SEEN ON CAT SCAN: ICD-10-CM

## 2019-06-06 DIAGNOSIS — E78.5 DYSLIPIDEMIA: ICD-10-CM

## 2019-06-06 LAB — EKG IMPRESSION: NORMAL

## 2019-06-06 PROCEDURE — 99205 OFFICE O/P NEW HI 60 MIN: CPT | Performed by: INTERNAL MEDICINE

## 2019-06-06 PROCEDURE — 93000 ELECTROCARDIOGRAM COMPLETE: CPT | Performed by: INTERNAL MEDICINE

## 2019-06-06 RX ORDER — TAMSULOSIN HYDROCHLORIDE 0.4 MG/1
CAPSULE ORAL
COMMUNITY
Start: 2016-08-26 | End: 2019-10-09

## 2019-06-06 RX ORDER — ATORVASTATIN CALCIUM 40 MG/1
TABLET, FILM COATED ORAL
Refills: 3 | COMMUNITY
Start: 2019-04-17 | End: 2020-04-30 | Stop reason: SDUPTHER

## 2019-06-06 RX ORDER — LORAZEPAM 0.5 MG/1
TABLET ORAL
COMMUNITY
Start: 2017-02-28 | End: 2019-10-09

## 2019-06-06 RX ORDER — IBUPROFEN 800 MG/1
800 TABLET ORAL
COMMUNITY
Start: 2017-02-03 | End: 2020-06-22

## 2019-06-06 RX ORDER — ATORVASTATIN CALCIUM 40 MG/1
TABLET, FILM COATED ORAL
COMMUNITY
Start: 2018-08-15 | End: 2019-10-09

## 2019-06-06 ASSESSMENT — ENCOUNTER SYMPTOMS
CHILLS: 0
ABDOMINAL PAIN: 0
INSOMNIA: 0
HEARTBURN: 1
FEVER: 0
LOSS OF CONSCIOUSNESS: 0
PND: 0
BLURRED VISION: 0
ORTHOPNEA: 0
BRUISES/BLEEDS EASILY: 1
MYALGIAS: 0
SHORTNESS OF BREATH: 1
PALPITATIONS: 0
DIZZINESS: 1

## 2019-06-06 NOTE — PROGRESS NOTES
"Chief Complaint   Patient presents with   • Coronary Artery Disease       Subjective:   Nathan Rose is a 79 y.o. male who presents today self-referred for second opinion regarding exertional shortness of breath, \"chest tightness\" and an abnormal coronary calcification score.    The patient had been under the care of Dr.Anita Lima with [place] [facility] for at least the past year.  The patient tells me that he went to see Dr. Lima because he had exertional shortness of breath along with some substernal chest tightness.  According to the patient he underwent several tests including an EKG, a pharmacologic nuclear stress test and a coronary calcification CT scan with a score of 999.  The patient tells me that after review of these tests Dr. Lima recommended further enhanced cholesterol therapy with the change in his statin medications.    The patient however has continued to have some exertional fatigue and shortness of breath with some mild chest tightness with at least moderate activity.  He tells me that he is still very concerned about the coronary calcification score and has brought in the report for me to review.    The patient tells me that he has had 2 previous angiograms the last one by Dr. Macedo in [place] \"20 years ago\" at which time there was no evidence of blockage.  He has been on long-term cholesterol medications.    He denies a history of diabetes mellitus, hypertension and he is never smoked cigarettes.    Family history significant for his mother having \"heart problems\" in her 40s lived until she  in her 70s.  His father  of \"congestive heart failure\" at age 82.    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  He is .    Past Medical History:   Diagnosis Date   • Anesthesia     PONV   • Arthritis     generalized   • BPH (benign prostatic " hyperplasia)    • Breath shortness    • CAD (coronary artery disease)    • Cataract     left eye,not removed   • Dental disorder     upper denture   • Environmental and seasonal allergies    • Heart burn    • High cholesterol    • Indigestion      Past Surgical History:   Procedure Laterality Date   • TURP-VAPOR  11/8/2018    Procedure: TURP-VAPOR - GREENLIGHT LASER;  Surgeon: Grey Espinoza M.D.;  Location: SURGERY [facility];  Service: Urology   • CERVICAL DISK AND FUSION ANTERIOR  8/11/2016    Procedure: CERVICAL DISK AND FUSION ANTERIOR C3-5 and  Excision of bridging ostephytes C3-4,C4-5,C5-6 ;  Surgeon: Abimael Rock M.D.;  Location: SURGERY [facility];  Service:    • KNEE REPLACEMENT, TOTAL Left 2008    Partial left   • SHOULDER ARTHROSCOPY Left 1990's   • CHOLECYSTECTOMY  1970's   • CERVICAL LAMINECTOMY POSTERIOR     • TONSILLECTOMY       Family History   Problem Relation Age of Onset   • Heart Disease Mother    • Heart Disease Father    • Heart Failure Father      Social History     Social History   • Marital status:      Spouse name: N/A   • Number of children: N/A   • Years of education: N/A     Occupational History   • Not on file.     Social History Main Topics   • Smoking status: Never Smoker   • Smokeless tobacco: Never Used   • Alcohol use 0.0 oz/week      Comment: 2 per month   • Drug use: No   • Sexual activity: Not on file     Other Topics Concern   • Not on file     Social History Narrative   • No narrative on file     No Known Allergies  Outpatient Encounter Prescriptions as of 6/6/2019   Medication Sig Dispense Refill   • Polyethylene Glycol 3350 (MIRALAX PO) MIRALAX POWD     • LORazepam (ATIVAN) 0.5 MG Tab LORAZEPAM 0.5 MG TABS     • ibuprofen (MOTRIN) 800 MG Tab IBUPROFEN 800 MG TABS     • diphenhydrAMINE (BENADRYL) 25 MG capsule Take 25 mg by mouth 2 times a day as needed. Indications: Hayfever     • lansoprazole (PREVACID) 30 MG CPDR Take 30 mg by mouth every day.  "Indications: Gastroesophageal Reflux Disease     • simvastatin (ZOCOR) 20 MG TABS Take 20 mg by mouth every evening.     • atorvastatin (LIPITOR) 40 MG Tab   3   • tamsulosin (FLOMAX) 0.4 MG capsule TAMSULOSIN HCL 0.4 MG CAPS     • atorvastatin (LIPITOR) 40 MG Tab ATORVASTATIN CALCIUM 40 MG TABS     • tamsulosin (FLOMAX) 0.4 MG capsule Take 0.4 mg by mouth every evening.       No facility-administered encounter medications on file as of 6/6/2019.      Review of Systems   Constitutional: Positive for malaise/fatigue. Negative for chills and fever.   HENT: Positive for hearing loss. Negative for congestion.    Eyes: Negative for blurred vision.   Respiratory: Positive for shortness of breath.    Cardiovascular: Positive for chest pain. Negative for palpitations, orthopnea, leg swelling and PND.   Gastrointestinal: Positive for heartburn. Negative for abdominal pain.   Genitourinary: Negative for dysuria.   Musculoskeletal: Negative for joint pain and myalgias.   Skin: Negative for rash.   Neurological: Positive for dizziness. Negative for loss of consciousness.   Endo/Heme/Allergies: Positive for environmental allergies. Bruises/bleeds easily.   Psychiatric/Behavioral: The patient does not have insomnia.         Objective:   /76 (BP Location: Left arm, Patient Position: Sitting, BP Cuff Size: Adult)   Pulse 74   Ht 1.956 m (6' 5\")   Wt 93.9 kg (207 lb)   SpO2 95%   BMI 24.55 kg/m²     Physical Exam   Constitutional: He is oriented to person, place, and time. He appears well-developed and well-nourished. No distress.   Eyes: Pupils are equal, round, and reactive to light. Conjunctivae and EOM are normal.   Neck: Normal range of motion. Neck supple. No JVD present.   Cardiovascular: Normal rate, regular rhythm, normal heart sounds and intact distal pulses.  Exam reveals no gallop and no friction rub.    No murmur heard.  Pulses:       Carotid pulses are 2+ on the right side, and 2+ on the left " side.  Pulmonary/Chest: Effort normal and breath sounds normal. No accessory muscle usage. No respiratory distress. He has no wheezes. He has no rales.   Abdominal: Soft. He exhibits no distension and no mass. There is no hepatosplenomegaly. There is no tenderness.   Musculoskeletal: He exhibits no edema.   Neurological: He is alert and oriented to person, place, and time.   Skin: Skin is warm, dry and intact. No rash noted. No cyanosis. Nails show no clubbing.   Psychiatric: He has a normal mood and affect. His behavior is normal.   Vitals reviewed.    EKG 06/06/2019 normal sinus rhythm, rate 68.  Early transition.  Reviewed by myself.    Coronary calcification scan 04/16/2018 coronary calcification score 999.    Assessment:     1. Coronary artery disease with angina pectoris, unspecified vessel or lesion type, unspecified whether native or transplanted heart (HCC)  EKG   2. Coronary artery calcification seen on CAT scan     3. Dyslipidemia         Medical Decision Making:  Today's Assessment / Status / Plan:     Assessment  1.  Exertional shortness of breath and chest tightness suggestive of angina pectoris.  2.  Coronary artery disease as manifest by coronary calcification with an elevated calcium score of 999.  3.  Dyslipidemia.    Recommendation Discussion  1.  I had a detailed discussion with the patient concerning the issue of his symptoms of exertional shortness of breath and chest tightness in the context of the findings of his coronary calcification score from 2018.  2.  I indicated that the only definitive way to exclude obstructive coronary artery disease would be to proceed with a coronary angiogram with its attendant risks and potential benefits of therapeutic scenarios including coronary stenting.  3.  The patient currently wishes to consider whether or not to proceed with another coronary angiogram.  4.  We will obtain his previous medical records from [facility] cardiology.

## 2019-10-09 ENCOUNTER — OFFICE VISIT (OUTPATIENT)
Dept: CARDIOLOGY | Facility: MEDICAL CENTER | Age: 79
End: 2019-10-09
Payer: COMMERCIAL

## 2019-10-09 VITALS
OXYGEN SATURATION: 97 % | SYSTOLIC BLOOD PRESSURE: 126 MMHG | HEIGHT: 77 IN | BODY MASS INDEX: 24.42 KG/M2 | DIASTOLIC BLOOD PRESSURE: 70 MMHG | WEIGHT: 206.8 LBS | HEART RATE: 64 BPM

## 2019-10-09 DIAGNOSIS — I25.10 CORONARY ARTERY CALCIFICATION SEEN ON CAT SCAN: ICD-10-CM

## 2019-10-09 DIAGNOSIS — R93.1 ABNORMAL NUCLEAR CARDIAC IMAGING TEST: ICD-10-CM

## 2019-10-09 DIAGNOSIS — E78.5 DYSLIPIDEMIA: ICD-10-CM

## 2019-10-09 PROCEDURE — 99214 OFFICE O/P EST MOD 30 MIN: CPT | Performed by: INTERNAL MEDICINE

## 2019-10-09 ASSESSMENT — ENCOUNTER SYMPTOMS
MYALGIAS: 0
LOSS OF CONSCIOUSNESS: 0
DIZZINESS: 0
PALPITATIONS: 0
COUGH: 0
SHORTNESS OF BREATH: 0

## 2019-10-09 NOTE — PROGRESS NOTES
"Chief Complaint   Patient presents with   • Coronary Artery Disease       Subjective:   Nathan Rose is a 79 y.o. male who presents today self-referred for second opinion regarding exertional shortness of breath, \"chest tightness\" and an abnormal coronary calcification score.    Last seen on 2019.    Since 2019 the patient has had no cardiac problems or symptoms.  He just returned from a 7-day [place] cruise without any cardiac symptoms.  No chest pain, shortness of breath.  On aspirin daily.    The patient had been under the care of Dr.Anita Lima with [place] [facility] for at least the past year.  The patient tells me that he went to see Dr. Lima because he had exertional shortness of breath along with some substernal chest tightness.  According to the patient he underwent several tests including an EKG, a pharmacologic nuclear stress test and a coronary calcification CT scan with a score of 999.  The patient tells me that after review of these tests Dr. Lima recommended further enhanced cholesterol therapy with the change in his statin medications.    The patient however has continued to have some exertional fatigue and shortness of breath with some mild chest tightness with at least moderate activity.  He tells me that he is still very concerned about the coronary calcification score and has brought in the report for me to review.    The patient tells me that he has had 2 previous angiograms the last one by Dr. Macedo in [place] \"20 years ago\" at which time there was no evidence of blockage.  He has been on long-term cholesterol medications.    He denies a history of diabetes mellitus, hypertension and he is never smoked cigarettes.    Family history significant for his mother having \"heart problems\" in her 40s lived until she  in her 70s.  His father  of \"congestive heart failure\" at age 82.    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  He is .    Past Medical History:   Diagnosis Date   • Anesthesia     PONV   • Arthritis     generalized   • BPH (benign prostatic hyperplasia)    • Breath shortness    • CAD (coronary artery disease)    • Cataract     left eye,not removed   • Dental disorder     upper denture   • Environmental and seasonal allergies    • Heart burn    • High cholesterol    • Indigestion      Past Surgical History:   Procedure Laterality Date   • TURP-VAPOR  11/8/2018    Procedure: TURP-VAPOR - GREENLIGHT LASER;  Surgeon: Grey Espinoza M.D.;  Location: SURGERY [facility];  Service: Urology   • CERVICAL DISK AND FUSION ANTERIOR  8/11/2016    Procedure: CERVICAL DISK AND FUSION ANTERIOR C3-5 and  Excision of bridging ostephytes C3-4,C4-5,C5-6 ;  Surgeon: Abimael Rock M.D.;  Location: SURGERY [facility];  Service:    • KNEE REPLACEMENT, TOTAL Left 2008    Partial left   • SHOULDER ARTHROSCOPY Left 1990's   • CHOLECYSTECTOMY  1970's   • CERVICAL LAMINECTOMY POSTERIOR     • TONSILLECTOMY       Family History   Problem Relation Age of Onset   • Heart Disease Mother    • Heart Disease Father    • Heart Failure Father      Social History     Socioeconomic History   • Marital status:      Spouse name: Not on file   • Number of children: Not on file   • Years of education: Not on file   • Highest education level: Not on file   Occupational History   • Not on file   Social Needs   • Financial resource strain: Not on file   • Food insecurity:     Worry: Not on file     Inability: Not on file   • Transportation needs:     Medical: Not on file     Non-medical: Not on file   Tobacco Use   • Smoking status: Never Smoker   • Smokeless tobacco: Never Used   Substance and Sexual Activity   • Alcohol use: Yes     Alcohol/week: 0.0 oz     Comment: 2 per month   • Drug use: No   • Sexual activity: Not on file   Lifestyle   • Physical  activity:     Days per week: Not on file     Minutes per session: Not on file   • Stress: Not on file   Relationships   • Social connections:     Talks on phone: Not on file     Gets together: Not on file     Attends Sabian[employer] service: Not on file     Active member of club or organization: Not on file     Attends meetings of clubs or organizations: Not on file     Relationship status: Not on file   • Intimate partner violence:     Fear of current or ex partner: Not on file     Emotionally abused: Not on file     Physically abused: Not on file     Forced sexual activity: Not on file   Other Topics Concern   • Not on file   Social History Narrative   • Not on file     No Known Allergies  Outpatient Encounter Medications as of 10/9/2019   Medication Sig Dispense Refill   • aspirin EC (ECOTRIN) 81 MG Tablet Delayed Response Take 81 mg by mouth every day.     • atorvastatin (LIPITOR) 40 MG Tab   3   • Polyethylene Glycol 3350 (MIRALAX PO) MIRALAX POWD     • ibuprofen (MOTRIN) 800 MG Tab IBUPROFEN 800 MG TABS     • diphenhydrAMINE (BENADRYL) 25 MG capsule Take 25 mg by mouth 2 times a day as needed. Indications: Hayfever     • lansoprazole (PREVACID) 30 MG CPDR Take 30 mg by mouth every day. Indications: Gastroesophageal Reflux Disease     • [DISCONTINUED] tamsulosin (FLOMAX) 0.4 MG capsule TAMSULOSIN HCL 0.4 MG CAPS     • [DISCONTINUED] LORazepam (ATIVAN) 0.5 MG Tab LORAZEPAM 0.5 MG TABS     • [DISCONTINUED] atorvastatin (LIPITOR) 40 MG Tab ATORVASTATIN CALCIUM 40 MG TABS     • [DISCONTINUED] tamsulosin (FLOMAX) 0.4 MG capsule Take 0.4 mg by mouth every evening.     • [DISCONTINUED] simvastatin (ZOCOR) 20 MG TABS Take 20 mg by mouth every evening.       No facility-administered encounter medications on file as of 10/9/2019.      Review of Systems   Respiratory: Negative for cough and shortness of breath.    Cardiovascular: Negative for chest pain and palpitations.   Musculoskeletal: Negative for myalgias.   Neurological:  "Negative for dizziness and loss of consciousness.        Objective:   /70 (BP Location: Left arm, Patient Position: Sitting, BP Cuff Size: Adult)   Pulse 64   Ht 1.956 m (6' 5\")   Wt 93.8 kg (206 lb 12.8 oz)   SpO2 97%   BMI 24.52 kg/m²     Physical Exam   Constitutional: He is oriented to person, place, and time. He appears well-developed and well-nourished. No distress.   Eyes: Pupils are equal, round, and reactive to light. Conjunctivae and EOM are normal.   Neck: Normal range of motion. Neck supple. No JVD present.   Cardiovascular: Normal rate, regular rhythm, normal heart sounds and intact distal pulses. Exam reveals no gallop and no friction rub.   No murmur heard.  Pulses:       Carotid pulses are 2+ on the right side, and 2+ on the left side.  Pulmonary/Chest: Effort normal and breath sounds normal. No accessory muscle usage. No respiratory distress. He has no wheezes. He has no rales.   Abdominal: Soft. He exhibits no distension and no mass. There is no hepatosplenomegaly. There is no tenderness.   Musculoskeletal: He exhibits no edema.   Neurological: He is alert and oriented to person, place, and time.   Skin: Skin is warm, dry and intact. No rash noted. No cyanosis. Nails show no clubbing.   Psychiatric: He has a normal mood and affect. His behavior is normal.   Vitals reviewed.    EKG 06/06/2019 normal sinus rhythm, rate 68.  Early transition.  Reviewed by myself.    Coronary calcification scan 04/16/2018 coronary calcification score 999.    Assessment:     1. Dyslipidemia  LIPID PANEL    Comp Metabolic Panel   2. Coronary artery calcification seen on CAT scan     3. Abnormal nuclear cardiac imaging test         Medical Decision Making:  Today's Assessment / Status / Plan:     Assessment  1.  Exertional shortness of breath and chest tightness suggestive of angina pectoris.  2.  Coronary artery disease as manifest by coronary calcification with an elevated calcium score of 999.  3.  " Dyslipidemia.    Recommendation Discussion  1.  The patient's MPI was reviewed which showed a minimal apical defect.  2.  The patient is having no cardiac symptoms at this time.  3.  He will continue his current cardiac therapy.  4.  Follow-up lipid panel.  5.  Follow-up 6 months.  Sooner if necessary.

## 2020-03-04 ENCOUNTER — OFFICE VISIT (OUTPATIENT)
Dept: URGENT CARE | Facility: MEDICAL CENTER | Age: 80
End: 2020-03-04
Payer: MEDICARE

## 2020-03-04 VITALS
TEMPERATURE: 100.2 F | HEART RATE: 86 BPM | OXYGEN SATURATION: 95 % | HEIGHT: 77 IN | SYSTOLIC BLOOD PRESSURE: 122 MMHG | WEIGHT: 205 LBS | DIASTOLIC BLOOD PRESSURE: 62 MMHG | BODY MASS INDEX: 24.21 KG/M2

## 2020-03-04 DIAGNOSIS — J22 LRTI (LOWER RESPIRATORY TRACT INFECTION): ICD-10-CM

## 2020-03-04 DIAGNOSIS — J40 BRONCHITIS: ICD-10-CM

## 2020-03-04 DIAGNOSIS — R05.9 COUGH: ICD-10-CM

## 2020-03-04 PROCEDURE — 99204 OFFICE O/P NEW MOD 45 MIN: CPT | Performed by: NURSE PRACTITIONER

## 2020-03-04 RX ORDER — AZITHROMYCIN 250 MG/1
TABLET, FILM COATED ORAL
Qty: 6 TAB | Refills: 0 | Status: SHIPPED | OUTPATIENT
Start: 2020-03-04 | End: 2020-03-09

## 2020-03-04 RX ORDER — BENZONATATE 200 MG/1
200 CAPSULE ORAL EVERY 8 HOURS PRN
Qty: 30 CAP | Refills: 0 | Status: SHIPPED | OUTPATIENT
Start: 2020-03-04 | End: 2020-06-22

## 2020-03-04 RX ORDER — ALBUTEROL SULFATE 90 UG/1
1-2 AEROSOL, METERED RESPIRATORY (INHALATION) EVERY 4 HOURS PRN
Qty: 1 INHALER | Refills: 0 | Status: SHIPPED | OUTPATIENT
Start: 2020-03-04 | End: 2022-01-03

## 2020-03-04 ASSESSMENT — ENCOUNTER SYMPTOMS
SHORTNESS OF BREATH: 0
FEVER: 0
RHINORRHEA: 1
CARDIOVASCULAR NEGATIVE: 1
COUGH: 1

## 2020-03-05 NOTE — PROGRESS NOTES
Subjective:     Nathan Rose is a 79 y.o. male who presents for Cough (1 week)       Cough   This is a new problem. The problem has been gradually worsening. Associated symptoms include rhinorrhea. Pertinent negatives include no fever or shortness of breath. Treatments tried: Antihistamine. The treatment provided no relief. His past medical history is significant for bronchitis and environmental allergies.     PMH:  has a past medical history of Anesthesia, Arthritis, BPH (benign prostatic hyperplasia), Breath shortness, CAD (coronary artery disease), Cataract, Dental disorder, Environmental and seasonal allergies, Heart burn, High cholesterol, and Indigestion.    MEDS:   Current Outpatient Medications:   •  benzonatate (TESSALON) 200 MG capsule, Take 1 Cap by mouth every 8 hours as needed for Cough., Disp: 30 Cap, Rfl: 0  •  albuterol 108 (90 Base) MCG/ACT Aero Soln inhalation aerosol, Inhale 1-2 Puffs by mouth every four hours as needed (shortness of breath, bronchospasm, wheezing)., Disp: 1 Inhaler, Rfl: 0  •  azithromycin (ZITHROMAX) 250 MG Tab, Take 2 tabs by mouth once today, then one tab by mouth once daily days 2-5., Disp: 6 Tab, Rfl: 0  •  atorvastatin (LIPITOR) 40 MG Tab, , Disp: , Rfl: 3  •  aspirin EC (ECOTRIN) 81 MG Tablet Delayed Response, Take 81 mg by mouth every day., Disp: , Rfl:   •  Polyethylene Glycol 3350 (MIRALAX PO), MIRALAX POWD, Disp: , Rfl:   •  ibuprofen (MOTRIN) 800 MG Tab, IBUPROFEN 800 MG TABS, Disp: , Rfl:   •  diphenhydrAMINE (BENADRYL) 25 MG capsule, Take 25 mg by mouth 2 times a day as needed. Indications: Hayfever, Disp: , Rfl:   •  lansoprazole (PREVACID) 30 MG CPDR, Take 30 mg by mouth every day. Indications: Gastroesophageal Reflux Disease, Disp: , Rfl:     ALLERGIES: No Known Allergies    SURGHX:   Past Surgical History:   Procedure Laterality Date   • TURP-VAPOR  11/8/2018    Procedure: TURP-VAPOR - GREENLIGHT LASER;  Surgeon: Grey Espinoza M.D.;  Location: SURGERY  [facility];  Service: Urology   • CERVICAL DISK AND FUSION ANTERIOR  8/11/2016    Procedure: CERVICAL DISK AND FUSION ANTERIOR C3-5 and  Excision of bridging ostephytes C3-4,C4-5,C5-6 ;  Surgeon: Abimael Rock M.D.;  Location: SURGERY [facility];  Service:    • KNEE REPLACEMENT, TOTAL Left 2008    Partial left   • SHOULDER ARTHROSCOPY Left 1990's   • CHOLECYSTECTOMY  1970's   • CERVICAL LAMINECTOMY POSTERIOR     • TONSILLECTOMY       SOCHX:  reports that he has never smoked. He has never used smokeless tobacco. He reports current alcohol use. He reports that he does not use drugs.     FH: Reviewed with patient, not pertinent to this visit.    Review of Systems   Constitutional: Positive for malaise/fatigue. Negative for fever.   HENT: Positive for congestion and rhinorrhea.    Respiratory: Positive for cough. Negative for shortness of breath.    Cardiovascular: Negative.    Endo/Heme/Allergies: Positive for environmental allergies.   All other systems reviewed and are negative.    Additional details per HPI.    Objective:     /62 (BP Location: Left arm, Patient Position: Sitting, BP Cuff Size: Adult)   Pulse 86   Temp 37.9 °C (100.2 °F) (Temporal)   Ht 1.956 m (6' 5\")   Wt 93 kg (205 lb)   SpO2 95%   BMI 24.31 kg/m²     Physical Exam  Vitals signs reviewed.   Constitutional:       General: He is not in acute distress.     Appearance: He is well-developed. He is not toxic-appearing or diaphoretic.   HENT:      Head: Normocephalic.      Right Ear: Tympanic membrane and external ear normal.      Left Ear: Tympanic membrane and external ear normal.      Nose: Mucosal edema and rhinorrhea present.      Mouth/Throat:      Mouth: Mucous membranes are moist.      Pharynx: Oropharynx is clear. Uvula midline.   Eyes:      Extraocular Movements: Extraocular movements intact.      Conjunctiva/sclera: Conjunctivae normal.      Pupils: Pupils are equal, round, and reactive to light.   Neck:      " Musculoskeletal: Normal range of motion.   Cardiovascular:      Rate and Rhythm: Normal rate and regular rhythm.      Pulses: Normal pulses.      Heart sounds: Normal heart sounds.   Pulmonary:      Effort: Pulmonary effort is normal. No respiratory distress.      Breath sounds: Rhonchi present. No decreased breath sounds.   Abdominal:      General: Bowel sounds are normal.      Palpations: Abdomen is soft.      Tenderness: There is no abdominal tenderness.   Musculoskeletal: Normal range of motion.         General: No deformity.   Skin:     General: Skin is warm and dry.      Capillary Refill: Capillary refill takes less than 2 seconds.      Coloration: Skin is not pale.   Neurological:      Mental Status: He is alert and oriented to person, place, and time.      Sensory: No sensory deficit.      Coordination: Coordination normal.   Psychiatric:         Behavior: Behavior normal. Behavior is cooperative.          Assessment/Plan:     1. Bronchitis  - albuterol 108 (90 Base) MCG/ACT Aero Soln inhalation aerosol; Inhale 1-2 Puffs by mouth every four hours as needed (shortness of breath, bronchospasm, wheezing).  Dispense: 1 Inhaler; Refill: 0    2. LRTI (lower respiratory tract infection)  - azithromycin (ZITHROMAX) 250 MG Tab; Take 2 tabs by mouth once today, then one tab by mouth once daily days 2-5.  Dispense: 6 Tab; Refill: 0    3. Cough  - benzonatate (TESSALON) 200 MG capsule; Take 1 Cap by mouth every 8 hours as needed for Cough.  Dispense: 30 Cap; Refill: 0    Various differentials discussed including allergic vs viral vs bacterial etiologies.    Rx as above sent electronically.     Discussed close monitoring and supportive measures including increasing fluids and rest as well as OTC symptom management per 's instructions. Antihistamines.    Temp 100.2 F, otherwise vital signs stable, asymptomatic, no acute distress.     Warning signs reviewed. Return precautions discussed.     Advised to obtain  discharge summary at the  prior to leaving.     Patient advised to: Return for 1) Symptoms don't improve or worsen, or go to ER, 2) Follow up with primary care in 7-10 days.    Differential diagnosis, natural history, supportive care, and indications for immediate follow-up discussed. All questions answered. Patient agrees with the plan of care.

## 2020-03-05 NOTE — PATIENT INSTRUCTIONS

## 2020-04-01 ENCOUNTER — TELEPHONE (OUTPATIENT)
Dept: HEALTH INFORMATION MANAGEMENT | Facility: OTHER | Age: 80
End: 2020-04-01

## 2020-04-01 NOTE — TELEPHONE ENCOUNTER
1. Caller Name: Nathan                       Call Back Number: [phone]  [facility] PCP or Specialty Provider: Yes         2.  Does patient have any active symptoms of respiratory illness (fever OR cough OR shortness of breath OR sore throat)? No. Just got over a cold    3.  Does patient have any comoribidities? None     4.  Has the patient traveled in the last 14 days OR had any known contact with someone who is suspected or confirmed to have COVID-19?  No.    5. Disposition: Advised to perform self care, monitor for worsening symptoms and to call back in 3 days if no improvement    Note routed to [facility] Provider: ROBERT only.

## 2020-04-30 DIAGNOSIS — E78.5 DYSLIPIDEMIA: Primary | ICD-10-CM

## 2020-05-07 RX ORDER — ATORVASTATIN CALCIUM 40 MG/1
40 TABLET, FILM COATED ORAL DAILY
Qty: 90 TAB | Refills: 2 | Status: SHIPPED | OUTPATIENT
Start: 2020-05-07 | End: 2021-06-03

## 2020-05-29 ENCOUNTER — OFFICE VISIT (OUTPATIENT)
Dept: PULMONOLOGY | Facility: HOSPICE | Age: 80
End: 2020-05-29
Payer: MEDICARE

## 2020-05-29 VITALS
OXYGEN SATURATION: 98 % | DIASTOLIC BLOOD PRESSURE: 78 MMHG | RESPIRATION RATE: 14 BRPM | WEIGHT: 202 LBS | BODY MASS INDEX: 23.85 KG/M2 | HEIGHT: 77 IN | HEART RATE: 64 BPM | TEMPERATURE: 97.9 F | SYSTOLIC BLOOD PRESSURE: 128 MMHG

## 2020-05-29 DIAGNOSIS — R06.00 DYSPNEA, UNSPECIFIED TYPE: ICD-10-CM

## 2020-05-29 DIAGNOSIS — Z23 ENCOUNTER FOR IMMUNIZATION: ICD-10-CM

## 2020-05-29 DIAGNOSIS — J44.0 CHRONIC OBSTRUCTIVE PULMONARY DISEASE WITH (ACUTE) LOWER RESPIRATORY INFECTION (HCC): ICD-10-CM

## 2020-05-29 DIAGNOSIS — J45.30 MILD PERSISTENT ASTHMA WITHOUT COMPLICATION: ICD-10-CM

## 2020-05-29 DIAGNOSIS — J98.4 OTHER DISORDERS OF LUNG: ICD-10-CM

## 2020-05-29 DIAGNOSIS — S22.42XS CLOSED FRACTURE OF MULTIPLE RIBS OF LEFT SIDE, SEQUELA: ICD-10-CM

## 2020-05-29 PROCEDURE — G0009 ADMIN PNEUMOCOCCAL VACCINE: HCPCS | Performed by: INTERNAL MEDICINE

## 2020-05-29 PROCEDURE — 90732 PPSV23 VACC 2 YRS+ SUBQ/IM: CPT | Performed by: INTERNAL MEDICINE

## 2020-05-29 PROCEDURE — 99204 OFFICE O/P NEW MOD 45 MIN: CPT | Mod: 25 | Performed by: INTERNAL MEDICINE

## 2020-05-29 RX ORDER — LORAZEPAM 0.5 MG/1
0.5 TABLET ORAL 2 TIMES DAILY PRN
COMMUNITY
End: 2022-01-03

## 2020-05-29 ASSESSMENT — PAIN SCALES - GENERAL: PAINLEVEL: NO PAIN

## 2020-05-29 NOTE — PATIENT INSTRUCTIONS
Nathan Rose comes in today self-referred for evaluation of shortness of breath and dyspnea on exertion.  He is 80 years of age but slender build, fairly good health but in March required urgent care visit for bronchitis.  He used an inhaler briefly, it seemed to make some improvement.  He has no history of asthma or COPD.  He is a non-smoker.    In March he did have a bout of bronchitis but does not have recurrent infections.  His flu and Prevnar vaccines are current.    He has cardiology evaluation in July regarding his dyspnea on exertion and cardiac assessment.  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    We will assess his current status with lung function testing, to see if he has restriction or airflow obstruction with his prior exposures.  Also a walking oxygen test and chest x-ray regarding the above-mentioned problems, reassess after that is completed

## 2020-05-29 NOTE — PROGRESS NOTES
Nathan Rose is a 80 y.o. male here for shortness of breath and dyspnea on exertion with asbestos exposure and by rib fractures. Patient was referred by [facility].    History of Present Illness:      Nathan Rose comes in today self-referred for evaluation of shortness of breath and dyspnea on exertion.  He is 80 years of age but slender build, fairly good health but in March required urgent care visit for bronchitis.  He used an inhaler briefly, it seemed to make some improvement.  He has no history of asthma or COPD.  He is a non-smoker.    In March he did have a bout of bronchitis but does not have recurrent infections.  His flu and Prevnar vaccines are current.    He has cardiology evaluation in July regarding his dyspnea on exertion and cardiac assessment.  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    We will assess his current status with lung function testing, to see if he has restriction or airflow obstruction with his prior exposures.  Also a walking oxygen test and chest x-ray regarding the above-mentioned problems, reassess after that is completed    Constitutional ROS: No unexpected change in weight, No unexplained fevers  Eyes: No change in vision or blurring or double vision  Mouth/Throat ROS: No sore throat, No recent change in voice or hoarseness  Pulmonary ROS: See present history for pertinent positives  Cardiovascular ROS: No chest pain to suggest acute coronary syndrome  Gastrointestinal ROS: No abdominal pain to suggest peptic disease  Musculoskeletal/Extremities ROS: no acute artritis or unusual swelling  Hematologic/Lymphatic ROS: No easy bleeding or unusual lymph node swelling  Neurologic ROS: No new or unusual weakness  Psychiatric ROS: No hallucinations  Allergic/Immunologic: No  urticaria or allergic rash      Current  Outpatient Medications   Medication Sig Dispense Refill   • LORazepam (ATIVAN) 1 MG Tab Take 0.5 mg by mouth every four hours as needed for Anxiety.     • atorvastatin (LIPITOR) 40 MG Tab Take 1 Tab by mouth every day. 90 Tab 2   • albuterol 108 (90 Base) MCG/ACT Aero Soln inhalation aerosol Inhale 1-2 Puffs by mouth every four hours as needed (shortness of breath, bronchospasm, wheezing). 1 Inhaler 0   • aspirin EC (ECOTRIN) 81 MG Tablet Delayed Response Take 81 mg by mouth every day.     • Polyethylene Glycol 3350 (MIRALAX PO) MIRALAX POWD     • diphenhydrAMINE (BENADRYL) 25 MG capsule Take 25 mg by mouth 2 times a day as needed. Indications: Hayfever     • lansoprazole (PREVACID) 30 MG CPDR Take 30 mg by mouth every day. Indications: Gastroesophageal Reflux Disease     • benzonatate (TESSALON) 200 MG capsule Take 1 Cap by mouth every 8 hours as needed for Cough. 30 Cap 0   • ibuprofen (MOTRIN) 800 MG Tab IBUPROFEN 800 MG TABS       No current facility-administered medications for this visit.        Social History     Tobacco Use   • Smoking status: Never Smoker   • Smokeless tobacco: Never Used   Substance Use Topics   • Alcohol use: Yes     Alcohol/week: 0.0 oz     Comment: 2 per month   • Drug use: No        Past Medical History:   Diagnosis Date   • Anesthesia     PONV   • Arthritis     generalized   • BPH (benign prostatic hyperplasia)    • Breath shortness    • CAD (coronary artery disease)    • Cataract     left eye,not removed   • Dental disorder     upper denture   • Environmental and seasonal allergies    • Heart burn    • High cholesterol    • Indigestion    • Shortness of breath    • Wears glasses        Past Surgical History:   Procedure Laterality Date   • TURP-VAPOR  11/8/2018    Procedure: TURP-VAPOR - GREENLIGHT LASER;  Surgeon: Grey Espinoza M.D.;  Location: SURGERY [facility];  Service: Urology   • CERVICAL DISK AND FUSION ANTERIOR  8/11/2016    Procedure: CERVICAL DISK AND FUSION  "ANTERIOR C3-5 and  Excision of bridging ostephytes C3-4,C4-5,C5-6 ;  Surgeon: Abimael Rock M.D.;  Location: SURGERY [facility];  Service:    • KNEE REPLACEMENT, TOTAL Left 2008    Partial left   • SHOULDER ARTHROSCOPY Left 1990's   • CHOLECYSTECTOMY  1970's   • CERVICAL LAMINECTOMY POSTERIOR     • TONSILLECTOMY         Allergies: Patient has no known allergies.    Family History   Problem Relation Age of Onset   • Heart Disease Mother    • Heart Disease Father    • Heart Failure Father        Physical Examination    Vitals:    05/29/20 1348   Height: 1.956 m (6' 5\")   Weight: 91.6 kg (202 lb)   Weight % change since last entry.: 0 %   BP: 128/78   Pulse: 64   BMI (Calculated): 23.95   Resp: 14   Temp: 36.6 °C (97.9 °F)   TempSrc: Tympanic       General Appearance: alert, no distress  Skin: Skin color, texture, turgor normal. No rashes or lesions.  Eyes: negative  Oropharynx: Lips, mucosa, and tongue normal. Teeth and gums normal. Oropharynx moist and without lesion  Lungs: positive findings: Clear without wheezes or rhonchi  Heart: negative. RRR without murmur, gallop, or rubs.  No ectopy.  Abdomen: Abdomen soft, non-tender. . No masses,  No organomegaly  Extremities:  No deformities, edema, or skin discoloration  Joints: No acute arthritis  Peripheral Pulses:perfused  Neurologic: intact grossly  Clubbing or cyanosis    II (soft palate, uvula, fauces visible)    Imaging: Ordered and pending    PFTS: None pending      Assessment and Plan  1. Dyspnea, unspecified type  - Pneumococal Polysaccharide Vaccine 23-Valent =>3YO SQ/IM  - PULMONARY FUNCTION TESTS -Test requested: Complete Pulmonary Function Test; Future  - Exercise Test for Bronchospasm / 6-Minute Walk; Future  - DX-CHEST-2 VIEWS; Future    2. Mild persistent asthma without complication  Trial of rescue inhaler briefly effective  - Pneumococal Polysaccharide Vaccine 23-Valent =>3YO SQ/IM    3. Closed fracture of multiple ribs of left side, " sequela  Prior, no current symptoms  - Pneumococal Polysaccharide Vaccine 23-Valent =>3YO SQ/IM    4. Encounter for immunization   - Pneumococal Polysaccharide Vaccine 23-Valent =>3YO SQ/IM    5. Chronic obstructive pulmonary disease with (acute) lower respiratory infection (HCC)   - PULMONARY FUNCTION TESTS -Test requested: Complete Pulmonary Function Test; Future    6. Other disorders of lung   - Exercise Test for Bronchospasm / 6-Minute Walk; Future      Followup Return in about 2 months (around 7/29/2020) for follow up visit with Dr. Kristal Ruiz, with Chest X-ray, with PFT, with 6 minute walk.

## 2020-05-30 ENCOUNTER — HOSPITAL ENCOUNTER (OUTPATIENT)
Dept: RADIOLOGY | Facility: MEDICAL CENTER | Age: 80
End: 2020-05-30
Attending: INTERNAL MEDICINE
Payer: MEDICARE

## 2020-05-30 DIAGNOSIS — R06.00 DYSPNEA, UNSPECIFIED TYPE: ICD-10-CM

## 2020-05-30 PROCEDURE — 71046 X-RAY EXAM CHEST 2 VIEWS: CPT

## 2020-06-01 ENCOUNTER — TELEPHONE (OUTPATIENT)
Dept: HEALTH INFORMATION MANAGEMENT | Facility: OTHER | Age: 80
End: 2020-06-01

## 2020-06-01 NOTE — TELEPHONE ENCOUNTER
1. Caller Name: Nathan                 Call Back Number: [phone]  [facility] PCP or Specialty Provider: Yes Juan Alberto        2.  Does patient have any new or worsening symptoms of respiratory illness? Yes, the patient reports the following respiratory symptoms: cough and shortness of breath or difficulty breathing.  Chronic with no changes or worsening symptoms.    3.  Does patient have any comoribidities? None Asthma    4.  Has the patient traveled in the last 14 days OR had any known contact with someone who is suspected or confirmed to have COVID-19?  No.    5. Disposition: Advised to perform self care, monitor for worsening symptoms and to call back in 3 days if no improvement    Note routed to [facility] Provider: ROBERT only.

## 2020-06-05 ENCOUNTER — NON-PROVIDER VISIT (OUTPATIENT)
Dept: PULMONOLOGY | Facility: HOSPICE | Age: 80
End: 2020-06-05
Attending: INTERNAL MEDICINE
Payer: MEDICARE

## 2020-06-05 VITALS — BODY MASS INDEX: 24.87 KG/M2 | HEIGHT: 75 IN | WEIGHT: 200 LBS

## 2020-06-05 DIAGNOSIS — J44.0 CHRONIC OBSTRUCTIVE PULMONARY DISEASE WITH (ACUTE) LOWER RESPIRATORY INFECTION (HCC): ICD-10-CM

## 2020-06-05 DIAGNOSIS — R06.00 DYSPNEA, UNSPECIFIED TYPE: ICD-10-CM

## 2020-06-05 DIAGNOSIS — J98.4 OTHER DISORDERS OF LUNG: ICD-10-CM

## 2020-06-05 PROCEDURE — 94729 DIFFUSING CAPACITY: CPT | Performed by: INTERNAL MEDICINE

## 2020-06-05 PROCEDURE — 94618 PULMONARY STRESS TESTING: CPT | Performed by: INTERNAL MEDICINE

## 2020-06-05 PROCEDURE — 94726 PLETHYSMOGRAPHY LUNG VOLUMES: CPT | Performed by: INTERNAL MEDICINE

## 2020-06-05 PROCEDURE — 94060 EVALUATION OF WHEEZING: CPT | Performed by: INTERNAL MEDICINE

## 2020-06-05 ASSESSMENT — PULMONARY FUNCTION TESTS
FEV1/FVC_PREDICTED: 71
FEV1/FVC_PERCENT_PREDICTED: 72
FVC_PERCENT_PREDICTED: 87
FEV1/FVC_PERCENT_PREDICTED: 107
FVC: 4.12
FEV1/FVC_PERCENT_PREDICTED: 113
FEV1/FVC_PERCENT_CHANGE: -150
FVC: 4.24
FEV1/FVC: 77
FEV1/FVC_PERCENT_LLN: 60
FEV1/FVC_PERCENT_PREDICTED: 107
FVC_PERCENT_PREDICTED: 84
FEV1/FVC: 81
FEV1_PERCENT_PREDICTED: 93
FEV1/FVC_PERCENT_CHANGE: 6
FEV1_LLN: 2.91
FEV1_PREDICTED: 3.49
FEV1/FVC: 77
FEV1: 3.35
FEV1/FVC_PERCENT_PREDICTED: 114
FEV1/FVC: 81.31
FEV1: 3.25
FVC_PREDICTED: 4.86
FVC_LLN: 4.06
FEV1_PERCENT_CHANGE: 3
FEV1_PERCENT_CHANGE: -2
FEV1_PERCENT_PREDICTED: 96

## 2020-06-05 ASSESSMENT — 6 MINUTE WALK TEST (6MWT)
PERCEIVED BREATHLESSNESS AT 6 MIN: 0
HEART RATE AT 2 MIN: 85
PERCEIVED BREATHLESSNESS AT 4 MIN: 0
PERCEIVED FATIGUE AT 1 MIN: 0
BLOOD PRESSURE: RIGHT ARM
SAO2 AT 4 MIN: 96
HEART RATE: 65
PERCEIVED BREATHLESSNESS AT 2 MIN: 0
COMMENTS: PATIENT ON 2L
SAO2 AT 6 MIN: 96
SAO2 AT 1 MIN: 94
HEART RATE AT 1 MIN: 77
O2 SAT PERCENT ROOM AIR: 98
PERCEIVED FATIGUE AT 4 MIN: .5
SAO2 AT 5 MIN: 95
PERCEIVED BREATHLESSNESS AT 3 MIN: 0
HEART RATE AT 4 MIN: 74
PERCEIVED BREATHLESSNESS AT 1 MIN: .5
COMMENTS: PATIENT ON 2L
PERCEIVED FATIGUE AT 2 MIN: .5
SAO2 AT 2 MIN: 80
SITTING BLOOD PRESSURE: 125/80
HEART RATE AT 3 MIN: 80
PERCEIVED FATIGUE AT 6 MIN: 1
HEART RATE AT 5 MIN: 82
PERCEIVED FATIGUE AT 1 MIN: 1
COMMENTS: PATIENT ON 2L
SAO2 AT 3 MIN: 84
HEART RATE AT 1 MIN: 71
SAO2 AT 1 MIN: 98
PERCENT OF NORMAL WALKED: 43
PERCEIVED BREATHLESSNESS AT 5 MIN: 0
COMMENTS: PATIENT ON 2L
PERCEIVED FATIGUE AT 5 MIN: .5
BLOOD PRESSURE AT 1 MIN: 120/78
HEART RATE AT 6 MIN: 86
PERCEIVED FATIGUE AT 3 MIN: .5
PERCEIVED BREATHLESSNESS AT 1 MIN: 0

## 2020-06-05 NOTE — PROCEDURES
Interpretation;  There is significant desaturation with ambulation requiring supplemental oxygen at 2 L.  The patient does recover and maintain SaO2 greater than 89% on 2 L with activity.  Distance walked was 800 feet or 43% predicted.  This study would support use of supplemental oxygen at 2 L/min with activity.

## 2020-06-05 NOTE — PROCEDURES
Tech: Jaqueline Vo, RRT  Tech notes: Good patient effort & cooperation.  The results of this test meet the ATS/ERS standards for acceptability & reproducibility.  Test was performed on the [employer] [facility] Body Plethysmograph-Elite DX system.  Predicted values were GLI-2012 for spirometry, GLI- 2017 for DLCO, ITS for Lung Volumes.  The DLCO was uncorrected for Hgb.  A bronchodilator of Ventolin HFA -2puffs via spacer administered.  IVC less than 90%. FRCN2 for lung volumes were performed as patient was unable to perform the Pleth (Patient C/O claustrophobia) maneuver via Plethysmography.    Interpretation:  1.  Baseline spirometry shows normal flows.  2.  There is no significant bronchodilator response.  3.  Lung volumes are within normal limits.  4.  Diffusion capacity is within normal limits.  Overall pulmonary function testing is normal.  This does not rule out reactive airways disease.  Suggest clinical correlation.

## 2020-06-22 ENCOUNTER — APPOINTMENT (OUTPATIENT)
Dept: RADIOLOGY | Facility: MEDICAL CENTER | Age: 80
End: 2020-06-22
Attending: EMERGENCY MEDICINE
Payer: MEDICARE

## 2020-06-22 ENCOUNTER — HOSPITAL ENCOUNTER (EMERGENCY)
Facility: MEDICAL CENTER | Age: 80
End: 2020-06-22
Attending: EMERGENCY MEDICINE
Payer: MEDICARE

## 2020-06-22 VITALS
DIASTOLIC BLOOD PRESSURE: 71 MMHG | WEIGHT: 200 LBS | BODY MASS INDEX: 27.09 KG/M2 | HEIGHT: 72 IN | RESPIRATION RATE: 20 BRPM | HEART RATE: 67 BPM | OXYGEN SATURATION: 97 % | TEMPERATURE: 98.3 F | SYSTOLIC BLOOD PRESSURE: 146 MMHG

## 2020-06-22 DIAGNOSIS — F41.8 SITUATIONAL ANXIETY: ICD-10-CM

## 2020-06-22 DIAGNOSIS — R06.00 DYSPNEA, UNSPECIFIED TYPE: ICD-10-CM

## 2020-06-22 LAB
ALBUMIN SERPL BCP-MCNC: 4 G/DL (ref 3.2–4.9)
ALBUMIN/GLOB SERPL: 1.3 G/DL
ALP SERPL-CCNC: 47 U/L (ref 30–99)
ALT SERPL-CCNC: 19 U/L (ref 2–50)
ANION GAP SERPL CALC-SCNC: 11 MMOL/L (ref 7–16)
AST SERPL-CCNC: 24 U/L (ref 12–45)
BASOPHILS # BLD AUTO: 0.6 % (ref 0–1.8)
BASOPHILS # BLD: 0.05 K/UL (ref 0–0.12)
BILIRUB SERPL-MCNC: 0.4 MG/DL (ref 0.1–1.5)
BUN SERPL-MCNC: 19 MG/DL (ref 8–22)
CALCIUM SERPL-MCNC: 9 MG/DL (ref 8.4–10.2)
CHLORIDE SERPL-SCNC: 96 MMOL/L (ref 96–112)
CO2 SERPL-SCNC: 23 MMOL/L (ref 20–33)
CREAT SERPL-MCNC: 1.06 MG/DL (ref 0.5–1.4)
D DIMER PPP IA.FEU-MCNC: 0.27 UG/ML (FEU) (ref 0–0.5)
EKG IMPRESSION: NORMAL
EOSINOPHIL # BLD AUTO: 0.12 K/UL (ref 0–0.51)
EOSINOPHIL NFR BLD: 1.5 % (ref 0–6.9)
ERYTHROCYTE [DISTWIDTH] IN BLOOD BY AUTOMATED COUNT: 40.3 FL (ref 35.9–50)
GLOBULIN SER CALC-MCNC: 3 G/DL (ref 1.9–3.5)
GLUCOSE SERPL-MCNC: 93 MG/DL (ref 65–99)
HCT VFR BLD AUTO: 37.1 % (ref 42–52)
HGB BLD-MCNC: 12.9 G/DL (ref 14–18)
IMM GRANULOCYTES # BLD AUTO: 0.02 K/UL (ref 0–0.11)
IMM GRANULOCYTES NFR BLD AUTO: 0.3 % (ref 0–0.9)
LYMPHOCYTES # BLD AUTO: 2.2 K/UL (ref 1–4.8)
LYMPHOCYTES NFR BLD: 27.7 % (ref 22–41)
MCH RBC QN AUTO: 31.2 PG (ref 27–33)
MCHC RBC AUTO-ENTMCNC: 34.8 G/DL (ref 33.7–35.3)
MCV RBC AUTO: 89.8 FL (ref 81.4–97.8)
MONOCYTES # BLD AUTO: 0.83 K/UL (ref 0–0.85)
MONOCYTES NFR BLD AUTO: 10.5 % (ref 0–13.4)
NEUTROPHILS # BLD AUTO: 4.72 K/UL (ref 1.82–7.42)
NEUTROPHILS NFR BLD: 59.4 % (ref 44–72)
NRBC # BLD AUTO: 0 K/UL
NRBC BLD-RTO: 0 /100 WBC
NT-PROBNP SERPL IA-MCNC: 159 PG/ML (ref 0–125)
PLATELET # BLD AUTO: 244 K/UL (ref 164–446)
PMV BLD AUTO: 9.4 FL (ref 9–12.9)
POTASSIUM SERPL-SCNC: 4.1 MMOL/L (ref 3.6–5.5)
PROT SERPL-MCNC: 7 G/DL (ref 6–8.2)
RBC # BLD AUTO: 4.13 M/UL (ref 4.7–6.1)
SODIUM SERPL-SCNC: 130 MMOL/L (ref 135–145)
TROPONIN T SERPL-MCNC: 11 NG/L (ref 6–19)
WBC # BLD AUTO: 7.9 K/UL (ref 4.8–10.8)

## 2020-06-22 PROCEDURE — 84484 ASSAY OF TROPONIN QUANT: CPT

## 2020-06-22 PROCEDURE — 85379 FIBRIN DEGRADATION QUANT: CPT

## 2020-06-22 PROCEDURE — 85025 COMPLETE CBC W/AUTO DIFF WBC: CPT

## 2020-06-22 PROCEDURE — 99284 EMERGENCY DEPT VISIT MOD MDM: CPT

## 2020-06-22 PROCEDURE — 80053 COMPREHEN METABOLIC PANEL: CPT

## 2020-06-22 PROCEDURE — 36415 COLL VENOUS BLD VENIPUNCTURE: CPT

## 2020-06-22 PROCEDURE — 93005 ELECTROCARDIOGRAM TRACING: CPT | Performed by: EMERGENCY MEDICINE

## 2020-06-22 PROCEDURE — 83880 ASSAY OF NATRIURETIC PEPTIDE: CPT

## 2020-06-22 PROCEDURE — 71046 X-RAY EXAM CHEST 2 VIEWS: CPT

## 2020-06-22 PROCEDURE — 700102 HCHG RX REV CODE 250 W/ 637 OVERRIDE(OP): Performed by: EMERGENCY MEDICINE

## 2020-06-22 PROCEDURE — A9270 NON-COVERED ITEM OR SERVICE: HCPCS | Performed by: EMERGENCY MEDICINE

## 2020-06-22 RX ORDER — IBUPROFEN 800 MG/1
400 TABLET ORAL 2 TIMES DAILY PRN
COMMUNITY

## 2020-06-22 RX ORDER — LORAZEPAM 1 MG/1
0.5 TABLET ORAL ONCE
Status: COMPLETED | OUTPATIENT
Start: 2020-06-22 | End: 2020-06-22

## 2020-06-22 RX ADMIN — LORAZEPAM 0.5 MG: 1 TABLET ORAL at 18:10

## 2020-06-22 NOTE — ED NOTES
Med Rec complete per phone interview with pt  Allergies Reviewed  No ABX in the last 14 days    Pt takes ASPRIN

## 2020-06-22 NOTE — ED PROVIDER NOTES
ED Provider Note    CHIEF COMPLAINT  Chief Complaint   Patient presents with   • Shortness of Breath     x 2 weeks       HPI  Nathan Rose is a 80 y.o. male who presents to the emergency department for evaluation of shortness of breath.  The patient states his been having shortness of breath for the last couple of weeks.  He has been seen divided by his doctor had formal pulmonary function test which have been unremarkable.  He states he can check his pulse ox his pulse ox is normal he has no chest pain which feels like a short of breath is not getting enough oxygen.  The patient denies any cough.  No fevers chills or hemoptysis.  No history of PE or DVT.  It is not exertional or positional.  Seems to get worse every afternoon.  She comes in for further evaluation of his dyspnea.  The patient denies any other acute concerns or complaints.  States he takes Ativan chronically for anxiety and he has for years.    REVIEW OF SYSTEMS  See HPI for further details. All other systems are negative.    PAST MEDICAL HISTORY  Past Medical History:   Diagnosis Date   • Anesthesia     PONV   • Arthritis     generalized   • BPH (benign prostatic hyperplasia)    • Breath shortness    • CAD (coronary artery disease)    • Cataract     left eye,not removed   • Dental disorder     upper denture   • Environmental and seasonal allergies    • Heart burn    • High cholesterol    • Indigestion    • Shortness of breath    • Wears glasses        FAMILY HISTORY  Family History   Problem Relation Age of Onset   • Heart Disease Mother    • Heart Disease Father    • Heart Failure Father        SOCIAL HISTORY  Social History     Socioeconomic History   • Marital status:      Spouse name: Not on file   • Number of children: Not on file   • Years of education: Not on file   • Highest education level: Not on file   Occupational History   • Not on file   Social Needs   • Financial resource strain: Not on file   • Food insecurity     Worry:  Not on file     Inability: Not on file   • Transportation needs     Medical: Not on file     Non-medical: Not on file   Tobacco Use   • Smoking status: Never Smoker   • Smokeless tobacco: Never Used   Substance and Sexual Activity   • Alcohol use: Yes     Alcohol/week: 0.0 oz     Comment: 2 per month   • Drug use: No   • Sexual activity: Not on file   Lifestyle   • Physical activity     Days per week: Not on file     Minutes per session: Not on file   • Stress: Not on file   Relationships   • Social connections     Talks on phone: Not on file     Gets together: Not on file     Attends Yarsani service: Not on file     Active member of club or organization: Not on file     Attends meetings of clubs or organizations: Not on file     Relationship status: Not on file   • Intimate partner violence     Fear of current or ex partner: Not on file     Emotionally abused: Not on file     Physically abused: Not on file     Forced sexual activity: Not on file   Other Topics Concern   • Not on file   Social History Narrative   • Not on file       SURGICAL HISTORY  Past Surgical History:   Procedure Laterality Date   • TURP-VAPOR  11/8/2018    Procedure: TURP-VAPOR - GREENLIGHT LASER;  Surgeon: Grey Espinoza M.D.;  Location: SURGERY [facility];  Service: Urology   • CERVICAL DISK AND FUSION ANTERIOR  8/11/2016    Procedure: CERVICAL DISK AND FUSION ANTERIOR C3-5 and  Excision of bridging ostephytes C3-4,C4-5,C5-6 ;  Surgeon: Abimael Rock M.D.;  Location: SURGERY [facility];  Service:    • KNEE REPLACEMENT, TOTAL Left 2008    Partial left   • SHOULDER ARTHROSCOPY Left 1990's   • CHOLECYSTECTOMY  1970's   • CERVICAL LAMINECTOMY POSTERIOR     • TONSILLECTOMY         CURRENT MEDICATIONS  Home Medications     Reviewed by Christine Adhikari (Pharmacy Tech) on 06/22/20 at 1653  Med List Status: Complete   Medication Last Dose Status   albuterol 108 (90 Base) MCG/ACT Aero Soln inhalation aerosol 6/22/2020 Active    aspirin EC (ECOTRIN) 81 MG Tablet Delayed Response 6/21/2020 Active   atorvastatin (LIPITOR) 40 MG Tab 6/21/2020 Active   ibuprofen (MOTRIN) 800 MG Tab 6/21/2020 Active   LORazepam (ATIVAN) 0.5 MG Tab 6/21/2020 Active                ALLERGIES  No Known Allergies    PHYSICAL EXAM  VITAL SIGNS: /80   Pulse 70   Temp 36.8 °C (98.3 °F) (Temporal)   Resp 19   Ht 1.829 m (6')   Wt 90.7 kg (200 lb)   SpO2 100%   BMI 27.12 kg/m²    Constitutional: Well developed, Well nourished, No acute distress, Non-toxic appearance.   HENT: Normocephalic, Atraumatic, Bilateral external ears normal, Oropharynx moist, No oral exudates, Nose normal.   Eyes: PERRL, EOMI, Conjunctiva normal, No discharge.   Neck: Normal range of motion, No tenderness, Supple, No stridor.  Cardiovascular: Normal heart rate, Normal rhythm, No murmurs, No rubs, No gallops.   Thorax & Lungs: Normal breath sounds, No respiratory distress, No wheezing  Abdomen: Bowel sounds normal, Soft, No tenderness  Skin: Warm, Dry, No erythema, No rash.   Musculoskeletal: Good range of motion in all major joints.  No asymmetric edema.  Neurologic: Alert,  No focal deficits noted.   Psychiatric: Affect anxious    Results for orders placed or performed during the hospital encounter of 06/22/20   CBC WITH DIFFERENTIAL   Result Value Ref Range    WBC 7.9 4.8 - 10.8 K/uL    RBC 4.13 (L) 4.70 - 6.10 M/uL    Hemoglobin 12.9 (L) 14.0 - 18.0 g/dL    Hematocrit 37.1 (L) 42.0 - 52.0 %    MCV 89.8 81.4 - 97.8 fL    MCH 31.2 27.0 - 33.0 pg    MCHC 34.8 33.7 - 35.3 g/dL    RDW 40.3 35.9 - 50.0 fL    Platelet Count 244 164 - 446 K/uL    MPV 9.4 9.0 - 12.9 fL    Neutrophils-Polys 59.40 44.00 - 72.00 %    Lymphocytes 27.70 22.00 - 41.00 %    Monocytes 10.50 0.00 - 13.40 %    Eosinophils 1.50 0.00 - 6.90 %    Basophils 0.60 0.00 - 1.80 %    Immature Granulocytes 0.30 0.00 - 0.90 %    Nucleated RBC 0.00 /100 WBC    Neutrophils (Absolute) 4.72 1.82 - 7.42 K/uL    Lymphs (Absolute)  2.20 1.00 - 4.80 K/uL    Monos (Absolute) 0.83 0.00 - 0.85 K/uL    Eos (Absolute) 0.12 0.00 - 0.51 K/uL    Baso (Absolute) 0.05 0.00 - 0.12 K/uL    Immature Granulocytes (abs) 0.02 0.00 - 0.11 K/uL    NRBC (Absolute) 0.00 K/uL   COMP METABOLIC PANEL   Result Value Ref Range    Sodium 130 (L) 135 - 145 mmol/L    Potassium 4.1 3.6 - 5.5 mmol/L    Chloride 96 96 - 112 mmol/L    Co2 23 20 - 33 mmol/L    Anion Gap 11.0 7.0 - 16.0    Glucose 93 65 - 99 mg/dL    Bun 19 8 - 22 mg/dL    Creatinine 1.06 0.50 - 1.40 mg/dL    Calcium 9.0 8.4 - 10.2 mg/dL    AST(SGOT) 24 12 - 45 U/L    ALT(SGPT) 19 2 - 50 U/L    Alkaline Phosphatase 47 30 - 99 U/L    Total Bilirubin 0.4 0.1 - 1.5 mg/dL    Albumin 4.0 3.2 - 4.9 g/dL    Total Protein 7.0 6.0 - 8.2 g/dL    Globulin 3.0 1.9 - 3.5 g/dL    A-G Ratio 1.3 g/dL   TROPONIN   Result Value Ref Range    Troponin T 11 6 - 19 ng/L   proBrain Natriuretic Peptide, NT   Result Value Ref Range    NT-proBNP 159 (H) 0 - 125 pg/mL   D-DIMER   Result Value Ref Range    D-Dimer Screen 0.27 0.00 - 0.50 ug/mL (FEU)   ESTIMATED GFR   Result Value Ref Range    GFR If African American >60 >60 mL/min/1.73 m 2    GFR If Non African American >60 >60 mL/min/1.73 m 2   EKG (NOW)   Result Value Ref Range    Report       [facility] Emergency Dept.    Test Date:  2020  Pt Name:    JULIÁN NEWBY                   Department: [facility]  MRN:        [id number]                      [facility]  Gender:     Male                         Technician: [id number]  :        [id number]                   Requested By:PARKER PÉREZ  Order #:    [id number]                    [place] MD: PARKER PÉREZ. AMD    Measurements  Intervals                                Axis  Rate:       65                           P:          50  IL:         216                          QRS:        70  QRSD:       92                           T:          64  QT:         400  QTc:        416    Interpretive  Statements  SINUS RHYTHM  BORDERLINE AV CONDUCTION DELAY  Compared to ECG 06/06/2019 14:02:50  No significant changes  Electronically Signed On 6- 18:51:02 PDT by PARKER PÉREZ. AMD        RADIOLOGY/PROCEDURES  DX-CHEST-2 VIEWS   Final Result      No acute cardiopulmonary process is identified.      Sequelae of prior granulomatous exposure.      Atherosclerotic plaque.                  COURSE & MEDICAL DECISION MAKING  Pertinent Labs & Imaging studies reviewed. (See chart for details)    The patient presents to the emergency department for evaluation of shortness of breath.    For some time.  He has been seen as an outpatient by the pulmonologist who is started a trial of bronchodilator, ordered an x-ray did formal pulmonary function test.  Pulmonary function tests he has a copy of the results and they are normal.      Broad differential diagnosis was considered for his dyspnea including but not limited to COVID-19 infection, pneumonia, pneumothorax COPD or other cause of bronchospasm, heart failure, valvular heart disease ACS, ulnar embolism, and anxiety.    Patient has clear lungs on examination there is no wheezing with good air movement he has normal pulmonary function test.  I do not think he has bronchospasm.  2 view chest x-ray shows no acute abnormality.  He has some chronic findings of atherosclerosis and previous granulomatous disease.  I have considered PE, his d-dimer is negative.  I have considered a cardiac etiology but he has no murmur, and unchanged EKG and a very minimally elevated BNP and a normal troponin.  I do not think this is cardiac.  He may benefit from an echo but this does not need to be performed emergently.  The patient has no cough and no fever and considering the chronicity I think COVID-19 infection is unlikely.    The patient was anxious requiring more oxygen.  Suctionable is normal.  He got up and walk to the bathroom had a persistent oxygen saturation of 97% without  tachycardia or other significant abnormality.  He was given Ativan.  He normally takes 0.5 Ativan once a day.  Is prescribed twice a day.  He is given 0.5 mg tablet and felt much better and wants to go home.  At this point I do think anxiety is playing a role.  He has oxygen at home.  He is not hypoxemic he is not febrile ill or toxic at the remainder of his work-up can be performed as an outpatient.  I explained him he should see his primary care doctor and his neurologist.  He should have further work-up as an outpatient patient return for any new medical problems or complaints.    His questionsare answered, he is agreed with plan and discharged in good condition.    The patient was noted to have elevated blood pressure while in the ER and was counseled to see their doctor within one wee to have this rechecked.    Nicola Avendano M.D.  [address]  [address]  [place] [address]  [phone]              FINAL IMPRESSION  1. Dyspnea, unspecified type     2. Situational anxiety         2.   3.         Electronically signed by: Isaac Banks M.D., 6/22/2020 4:45 PM

## 2020-06-23 NOTE — ED NOTES
"With some clarification with patient, he is to wear O2 \"when feeling SOB\"  educated to use O2 @ 2 L.  ambulated to BR on RA and returned O2 sats checked 97%.  ERP updated.    "

## 2020-06-23 NOTE — ED NOTES
ERP at bedside. Pt agrees with plan of care discussed by ERP. AIDET acknowledged with patient. Errol in low position, side rail up for pt safety. Call light within reach. Will continue to monitor.

## 2020-06-23 NOTE — DISCHARGE INSTRUCTIONS
Follow-up with your doctor and the pulmonologist.  Return for worsening shortness of breath, if you have chest pain, fever cough or other concerns.

## 2020-07-01 ENCOUNTER — OFFICE VISIT (OUTPATIENT)
Dept: CARDIOLOGY | Facility: MEDICAL CENTER | Age: 80
End: 2020-07-01
Payer: MEDICARE

## 2020-07-01 ENCOUNTER — HOSPITAL ENCOUNTER (OUTPATIENT)
Dept: CARDIOLOGY | Facility: MEDICAL CENTER | Age: 80
End: 2020-07-01
Attending: INTERNAL MEDICINE
Payer: MEDICARE

## 2020-07-01 VITALS
OXYGEN SATURATION: 97 % | WEIGHT: 201.8 LBS | BODY MASS INDEX: 25.09 KG/M2 | DIASTOLIC BLOOD PRESSURE: 70 MMHG | HEART RATE: 68 BPM | SYSTOLIC BLOOD PRESSURE: 126 MMHG | HEIGHT: 75 IN

## 2020-07-01 DIAGNOSIS — R06.02 SOB (SHORTNESS OF BREATH): ICD-10-CM

## 2020-07-01 DIAGNOSIS — E78.5 DYSLIPIDEMIA: ICD-10-CM

## 2020-07-01 DIAGNOSIS — I34.0 MITRAL VALVE INSUFFICIENCY, UNSPECIFIED ETIOLOGY: ICD-10-CM

## 2020-07-01 DIAGNOSIS — I25.10 CORONARY ARTERY CALCIFICATION SEEN ON CAT SCAN: ICD-10-CM

## 2020-07-01 DIAGNOSIS — R93.1 ABNORMAL NUCLEAR CARDIAC IMAGING TEST: ICD-10-CM

## 2020-07-01 LAB
LV EJECT FRACT  99904: 65
LV EJECT FRACT MOD 2C 99903: 69.08
LV EJECT FRACT MOD 4C 99902: 64.73
LV EJECT FRACT MOD BP 99901: 66.62

## 2020-07-01 PROCEDURE — 99215 OFFICE O/P EST HI 40 MIN: CPT | Performed by: INTERNAL MEDICINE

## 2020-07-01 PROCEDURE — 93306 TTE W/DOPPLER COMPLETE: CPT | Mod: 26 | Performed by: INTERNAL MEDICINE

## 2020-07-01 PROCEDURE — 93306 TTE W/DOPPLER COMPLETE: CPT

## 2020-07-01 RX ORDER — IPRATROPIUM BROMIDE AND ALBUTEROL SULFATE 2.5; .5 MG/3ML; MG/3ML
SOLUTION RESPIRATORY (INHALATION)
COMMUNITY
Start: 2020-06-08 | End: 2022-01-03

## 2020-07-01 RX ORDER — SERTRALINE HYDROCHLORIDE 25 MG/1
TABLET, FILM COATED ORAL
COMMUNITY
Start: 2020-06-24 | End: 2022-01-03

## 2020-07-01 RX ORDER — LORAZEPAM 1 MG/1
TABLET ORAL
COMMUNITY
Start: 2020-06-30 | End: 2022-01-03

## 2020-07-01 ASSESSMENT — ENCOUNTER SYMPTOMS
COUGH: 0
DIZZINESS: 0
SHORTNESS OF BREATH: 0
LOSS OF CONSCIOUSNESS: 0
MYALGIAS: 0
PALPITATIONS: 0

## 2020-07-01 ASSESSMENT — FIBROSIS 4 INDEX: FIB4 SCORE: 1.83

## 2020-07-01 NOTE — PROGRESS NOTES
Chief Complaint   Patient presents with   • Coronary Artery Disease       Subjective:   Nathan Rose is a 79 y.o. male who presents today for coronary calcification, dyslipidemia and mitral regurgitation.    Last seen on 10/6/2019.    Since 10/6/2019 the patient has recently developed significant shortness of breath approximately 4 weeks ago.  Seen by Dr. Kristal Ruiz pulmonologist on 5/29/2020.  6-minute walk showed desaturation to 80% at 2 minutes.  PFTs unremarkable.  Started on continuous oxygen.  Continued to feel short of breath and went to [facility] 6/22/2020.  CXR unremarkable.  EKG unremarkable.  Troponin and BNP unremarkable.  Discharged home on no new medications.  Saw his PCP Dr. Nicola Avendano who prescribed some anxiolytics, lorazepam which the patient apparently did not tolerate.  Denies angina pectoris.  No PND, orthopnea or lower extremity edema.  Patient and wife concerned about PE however d-dimer was negative.    Since 6/6/2019 the patient has had no cardiac problems or symptoms.  He just returned from a 7-day [place] cruise without any cardiac symptoms.  No chest pain, shortness of breath.  On aspirin daily.    The patient had been under the care of Dr.Anita Lima with [facility] for at least the past year.  The patient tells me that he went to see Dr. Lima because he had exertional shortness of breath along with some substernal chest tightness.  According to the patient he underwent several tests including an EKG, a pharmacologic nuclear stress test and a coronary calcification CT scan with a score of 999.  The patient tells me that after review of these tests Dr. Lima recommended further enhanced cholesterol therapy with the change in his statin medications.    The patient however has continued to have some exertional fatigue and shortness of breath with some mild chest tightness with at least moderate activity.  He tells me that he is still very concerned about the  "coronary calcification score and has brought in the report for me to review.    The patient tells me that he has had 2 previous angiograms the last one by Dr. Macedo in [place] \"20 years ago\" at which time there was no evidence of blockage.  He has been on long-term cholesterol medications.    He denies a history of diabetes mellitus, hypertension and he is never smoked cigarettes.    Family history significant for his mother having \"heart problems\" in her 40s lived until she  in her 70s.  His father  of \"congestive heart failure\" at age 82.    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  He is .    Past Medical History:   Diagnosis Date   • Anesthesia     PONV   • Arthritis     generalized   • BPH (benign prostatic hyperplasia)    • Breath shortness    • CAD (coronary artery disease)    • Cataract     left eye,not removed   • Dental disorder     upper denture   • Environmental and seasonal allergies    • Heart burn    • High cholesterol    • Indigestion    • Shortness of breath    • Wears glasses      Past Surgical History:   Procedure Laterality Date   • TURP-VAPOR  2018    Procedure: TURP-VAPOR - GREENLIGHT LASER;  Surgeon: Grey Espinoza M.D.;  Location: SURGERY [facility];  Service: Urology   • CERVICAL DISK AND FUSION ANTERIOR  2016    Procedure: CERVICAL DISK AND FUSION ANTERIOR C3-5 and  Excision of bridging ostephytes C3-4,C4-5,C5-6 ;  Surgeon: Abimael Rock M.D.;  Location: SURGERY [facility];  Service:    • KNEE REPLACEMENT, TOTAL Left     Partial left   • SHOULDER ARTHROSCOPY Left    • CHOLECYSTECTOMY     • CERVICAL LAMINECTOMY POSTERIOR     • TONSILLECTOMY       Family History   Problem Relation Age of Onset   • Heart Disease Mother    • Heart Disease Father    • Heart Failure Father      Social History "     Socioeconomic History   • Marital status:      Spouse name: Not on file   • Number of children: Not on file   • Years of education: Not on file   • Highest education level: Not on file   Occupational History   • Not on file   Social Needs   • Financial resource strain: Not on file   • Food insecurity     Worry: Not on file     Inability: Not on file   • Transportation needs     Medical: Not on file     Non-medical: Not on file   Tobacco Use   • Smoking status: Never Smoker   • Smokeless tobacco: Never Used   Substance and Sexual Activity   • Alcohol use: Yes     Alcohol/week: 0.0 oz     Comment: 2 per month   • Drug use: No   • Sexual activity: Not on file   Lifestyle   • Physical activity     Days per week: Not on file     Minutes per session: Not on file   • Stress: Not on file   Relationships   • Social connections     Talks on phone: Not on file     Gets together: Not on file     Attends [employer] service: Not on file     Active member of club or organization: Not on file     Attends meetings of clubs or organizations: Not on file     Relationship status: Not on file   • Intimate partner violence     Fear of current or ex partner: Not on file     Emotionally abused: Not on file     Physically abused: Not on file     Forced sexual activity: Not on file   Other Topics Concern   • Not on file   Social History Narrative   • Not on file     No Known Allergies  Outpatient Encounter Medications as of 7/1/2020   Medication Sig Dispense Refill   • sertraline (ZOLOFT) 25 MG tablet Take  by mouth.     • ipratropium-albuterol (DUONEB) 0.5-2.5 (3) MG/3ML nebulizer solution USE 1 AMPULE IN NEBULIZER 4 TIMES DAILY AS NEEDED FOR COUGH AND FOR SHORTNESS OF BREATH     • ibuprofen (MOTRIN) 800 MG Tab Take 400 mg by mouth 2 times a day as needed for Mild Pain. 0.5 tablet = 400 mg     • LORazepam (ATIVAN) 0.5 MG Tab Take 0.5 mg by mouth 2 times a day as needed for Anxiety.     • atorvastatin (LIPITOR) 40 MG Tab Take 1 Tab  "by mouth every day. 90 Tab 2   • albuterol 108 (90 Base) MCG/ACT Aero Soln inhalation aerosol Inhale 1-2 Puffs by mouth every four hours as needed (shortness of breath, bronchospasm, wheezing). 1 Inhaler 0   • aspirin EC (ECOTRIN) 81 MG Tablet Delayed Response Take 81 mg by mouth every evening.     • LORazepam (ATIVAN) 1 MG Tab        No facility-administered encounter medications on file as of 7/1/2020.      Review of Systems   Respiratory: Negative for cough and shortness of breath.    Cardiovascular: Negative for chest pain and palpitations.   Musculoskeletal: Negative for myalgias.   Neurological: Negative for dizziness and loss of consciousness.        Objective:   /70 (BP Location: Left arm, Patient Position: Sitting, BP Cuff Size: Adult)   Pulse 68   Ht 1.905 m (6' 3\")   Wt 91.5 kg (201 lb 12.8 oz)   SpO2 97%   BMI 25.22 kg/m²     Physical Exam   Constitutional: He is oriented to person, place, and time. He appears well-developed and well-nourished. No distress.   Nasal cannula.  Anxious.   Eyes: Pupils are equal, round, and reactive to light. Conjunctivae and EOM are normal.   Neck: Normal range of motion. Neck supple. No JVD present.   Cardiovascular: Normal rate, regular rhythm, normal heart sounds and intact distal pulses. Exam reveals no gallop and no friction rub.   No murmur heard.  Pulses:       Carotid pulses are 2+ on the right side and 2+ on the left side.  Pulmonary/Chest: Effort normal and breath sounds normal. No accessory muscle usage. No respiratory distress. He has no wheezes. He has no rales.   Abdominal: Soft. He exhibits no distension and no mass. There is no hepatosplenomegaly. There is no abdominal tenderness.   Musculoskeletal:         General: No edema.   Neurological: He is alert and oriented to person, place, and time.   Skin: Skin is warm, dry and intact. No rash noted. No cyanosis. Nails show no clubbing.   Psychiatric: He has a normal mood and affect. His behavior is " normal.   Vitals reviewed.    6-minute walk 6/5/2020: Desaturation with 80% at 2 L result on O2.    PFTs 06/06/2016  1.  Baseline spirometry shows normal flows.  2.  There is no significant bronchodilator response.  3.  Lung volumes are within normal limits.  4.  Diffusion capacity is within normal limits.  Overall pulmonary function testing is normal.  This does not rule out reactive airways disease.  Suggest clinical correlation.    EKG 06/06/2019 normal sinus rhythm, rate 68.  Early transition.  Reviewed by myself.    Coronary calcification scan 04/16/2018 coronary calcification score 999.    Assessment:     1. Mitral valve insufficiency, unspecified etiology  EC-ECHOCARDIOGRAM COMPLETE W/O CONT   2. SOB (shortness of breath)  EC-ECHOCARDIOGRAM COMPLETE W/O CONT   3. Coronary artery calcification seen on CAT scan     4. Abnormal nuclear cardiac imaging test     5. Dyslipidemia  LIPID PANEL       Medical Decision Making:  Today's Assessment / Status / Plan:     Assessment  1.  Exertional shortness of breath.  Clear etiology.  2.  Coronary artery disease as manifest by coronary calcification with an elevated calcium score of 999.  3.  Dyslipidemia.  4.  Mitral regurgitation.    Recommendation Discussion  1.  Worsening shortness of breath unclear etiology however there is evidence of exertional desaturation.  2.  No obvious symptoms of ischemia or volume overload.  3.  Prior echocardiogram showed normal ejection fraction with mild to moderate mitral regurgitation.  4.  Will start with a stat echocardiogram today.  5.  Instructed to contact pulmonary clinic which he says he is done and is on a waiting list.  6.  Recent blood work 3/18/2020 showed LDL 82, total cholesterol 147.

## 2020-07-02 ENCOUNTER — TELEPHONE (OUTPATIENT)
Dept: PULMONOLOGY | Facility: HOSPICE | Age: 80
End: 2020-07-02

## 2020-07-02 NOTE — TELEPHONE ENCOUNTER
Spoke to Mrs. Rose, patient's wife Dara. Patient saw Dr Dorsey yesterday and they did some testing (echocardiogram?) that according Dara was cloudy and unclear showing the Pulmonic valve not well visualized.     Patient having difficulty keeping his 02 levels up especially with any sort of exertion. They will continue to monitor and if need be will call [facility] for transport to the ER.   They will try to use the nebulizer machine as he had only be using his recuse inhaler albuterol. I reminded them that try to use one or the other as albuterol CAN cause the heart to race.     They thought maybe a CT chest would show more clear details however the cardiologist has patient schedule for more testing. Patient will keep his pending appointment with Dr Ruiz for 7/29/20    Again they will call 911 if medical treatment is needed over this long weekend.

## 2020-07-07 ENCOUNTER — TELEPHONE (OUTPATIENT)
Dept: CARDIOLOGY | Facility: MEDICAL CENTER | Age: 80
End: 2020-07-07

## 2020-07-07 NOTE — TELEPHONE ENCOUNTER
To Dr. Torres: results of STAT echo.     EC-ECHOCARDIOGRAM COMPLETE W/O CONT   Order: [id number]   Status:  Final result   Visible to patient:  Yes (MyChart) Dx:  SOB (shortness of breath); Mitral madhu...   Narrative & Impression      Transthoracic  Echo Report        Echocardiography Laboratory     CONCLUSIONS  Technically difficult study incomplete information is obtained  Left ventricular ejection fraction is visually estimated to be 65-70%.  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  Mild eccentric mitral regurgitation, possible more severe, limited due   to technically limitations.  No prior study is available for comparison.

## 2020-07-14 ENCOUNTER — HOSPITAL ENCOUNTER (OUTPATIENT)
Dept: LAB | Facility: MEDICAL CENTER | Age: 80
End: 2020-07-14
Attending: PSYCHIATRY & NEUROLOGY
Payer: MEDICARE

## 2020-07-14 ENCOUNTER — HOSPITAL ENCOUNTER (OUTPATIENT)
Dept: LAB | Facility: MEDICAL CENTER | Age: 80
End: 2020-07-14
Attending: INTERNAL MEDICINE
Payer: MEDICARE

## 2020-07-14 DIAGNOSIS — E78.5 DYSLIPIDEMIA: ICD-10-CM

## 2020-07-14 LAB
ALBUMIN SERPL BCP-MCNC: 3.9 G/DL (ref 3.2–4.9)
ALBUMIN/GLOB SERPL: 1.3 G/DL
ALP SERPL-CCNC: 41 U/L (ref 30–99)
ALT SERPL-CCNC: 16 U/L (ref 2–50)
ANION GAP SERPL CALC-SCNC: 9 MMOL/L (ref 7–16)
AST SERPL-CCNC: 19 U/L (ref 12–45)
BASOPHILS # BLD AUTO: 0.7 % (ref 0–1.8)
BASOPHILS # BLD: 0.04 K/UL (ref 0–0.12)
BILIRUB SERPL-MCNC: 0.7 MG/DL (ref 0.1–1.5)
BUN SERPL-MCNC: 15 MG/DL (ref 8–22)
CALCIUM SERPL-MCNC: 9.1 MG/DL (ref 8.4–10.2)
CHLORIDE SERPL-SCNC: 95 MMOL/L (ref 96–112)
CHOLEST SERPL-MCNC: 122 MG/DL (ref 100–199)
CK SERPL-CCNC: 120 U/L (ref 0–154)
CO2 SERPL-SCNC: 26 MMOL/L (ref 20–33)
CREAT SERPL-MCNC: 1.04 MG/DL (ref 0.5–1.4)
EOSINOPHIL # BLD AUTO: 0.18 K/UL (ref 0–0.51)
EOSINOPHIL NFR BLD: 3 % (ref 0–6.9)
ERYTHROCYTE [DISTWIDTH] IN BLOOD BY AUTOMATED COUNT: 40.1 FL (ref 35.9–50)
ERYTHROCYTE [SEDIMENTATION RATE] IN BLOOD BY WESTERGREN METHOD: 2 MM/HOUR (ref 0–20)
FASTING STATUS PATIENT QL REPORTED: NORMAL
GLOBULIN SER CALC-MCNC: 2.9 G/DL (ref 1.9–3.5)
GLUCOSE SERPL-MCNC: 96 MG/DL (ref 65–99)
HCT VFR BLD AUTO: 38.4 % (ref 42–52)
HDLC SERPL-MCNC: 60 MG/DL
HGB BLD-MCNC: 13.4 G/DL (ref 14–18)
IMM GRANULOCYTES # BLD AUTO: 0.01 K/UL (ref 0–0.11)
IMM GRANULOCYTES NFR BLD AUTO: 0.2 % (ref 0–0.9)
LDLC SERPL CALC-MCNC: 49 MG/DL
LYMPHOCYTES # BLD AUTO: 2.05 K/UL (ref 1–4.8)
LYMPHOCYTES NFR BLD: 34.3 % (ref 22–41)
MCH RBC QN AUTO: 31.2 PG (ref 27–33)
MCHC RBC AUTO-ENTMCNC: 34.9 G/DL (ref 33.7–35.3)
MCV RBC AUTO: 89.5 FL (ref 81.4–97.8)
MONOCYTES # BLD AUTO: 0.66 K/UL (ref 0–0.85)
MONOCYTES NFR BLD AUTO: 11 % (ref 0–13.4)
NEUTROPHILS # BLD AUTO: 3.04 K/UL (ref 1.82–7.42)
NEUTROPHILS NFR BLD: 50.8 % (ref 44–72)
NRBC # BLD AUTO: 0 K/UL
NRBC BLD-RTO: 0 /100 WBC
PLATELET # BLD AUTO: 231 K/UL (ref 164–446)
PMV BLD AUTO: 9.1 FL (ref 9–12.9)
POTASSIUM SERPL-SCNC: 4.4 MMOL/L (ref 3.6–5.5)
PROT SERPL-MCNC: 6.8 G/DL (ref 6–8.2)
RBC # BLD AUTO: 4.29 M/UL (ref 4.7–6.1)
SODIUM SERPL-SCNC: 130 MMOL/L (ref 135–145)
TRIGL SERPL-MCNC: 63 MG/DL (ref 0–149)
WBC # BLD AUTO: 6 K/UL (ref 4.8–10.8)

## 2020-07-14 PROCEDURE — 82550 ASSAY OF CK (CPK): CPT

## 2020-07-14 PROCEDURE — 83519 RIA NONANTIBODY: CPT

## 2020-07-14 PROCEDURE — 85025 COMPLETE CBC W/AUTO DIFF WBC: CPT

## 2020-07-14 PROCEDURE — 80053 COMPREHEN METABOLIC PANEL: CPT

## 2020-07-14 PROCEDURE — 83516 IMMUNOASSAY NONANTIBODY: CPT

## 2020-07-14 PROCEDURE — 36415 COLL VENOUS BLD VENIPUNCTURE: CPT

## 2020-07-14 PROCEDURE — 85652 RBC SED RATE AUTOMATED: CPT

## 2020-07-14 PROCEDURE — 80061 LIPID PANEL: CPT

## 2020-07-14 PROCEDURE — 82164 ANGIOTENSIN I ENZYME TEST: CPT

## 2020-07-14 PROCEDURE — 86480 TB TEST CELL IMMUN MEASURE: CPT

## 2020-07-15 LAB
GAMMA INTERFERON BACKGROUND BLD IA-ACNC: 0.04 IU/ML
M TB IFN-G BLD-IMP: NEGATIVE
M TB IFN-G CD4+ BCKGRND COR BLD-ACNC: 0.06 IU/ML
MITOGEN IGNF BCKGRD COR BLD-ACNC: >10 IU/ML
QFT TB2 - NIL TBQ2: 0.01 IU/ML

## 2020-07-16 ENCOUNTER — APPOINTMENT (OUTPATIENT)
Dept: RADIOLOGY | Facility: MEDICAL CENTER | Age: 80
End: 2020-07-16
Attending: EMERGENCY MEDICINE
Payer: MEDICARE

## 2020-07-16 ENCOUNTER — HOSPITAL ENCOUNTER (EMERGENCY)
Facility: MEDICAL CENTER | Age: 80
End: 2020-07-16
Attending: EMERGENCY MEDICINE
Payer: MEDICARE

## 2020-07-16 VITALS
TEMPERATURE: 98.6 F | BODY MASS INDEX: 23.74 KG/M2 | HEART RATE: 68 BPM | OXYGEN SATURATION: 100 % | DIASTOLIC BLOOD PRESSURE: 83 MMHG | HEIGHT: 77 IN | WEIGHT: 201.06 LBS | RESPIRATION RATE: 20 BRPM | SYSTOLIC BLOOD PRESSURE: 152 MMHG

## 2020-07-16 DIAGNOSIS — F41.9 ANXIOUSNESS: ICD-10-CM

## 2020-07-16 DIAGNOSIS — R06.02 CHRONIC SHORTNESS OF BREATH: ICD-10-CM

## 2020-07-16 DIAGNOSIS — R06.00 DYSPNEA, UNSPECIFIED TYPE: ICD-10-CM

## 2020-07-16 LAB
ACE SERPL-CCNC: 23 U/L (ref 9–67)
ACHR BIND AB SER-SCNC: 0 NMOL/L (ref 0–0.4)
ACHR BLOCK AB/ACHR TOTAL SFR SER: 0 % (ref 0–26)
ALBUMIN SERPL BCP-MCNC: 4 G/DL (ref 3.2–4.9)
ALBUMIN/GLOB SERPL: 1.4 G/DL
ALP SERPL-CCNC: 42 U/L (ref 30–99)
ALT SERPL-CCNC: 19 U/L (ref 2–50)
ANION GAP SERPL CALC-SCNC: 13 MMOL/L (ref 7–16)
AST SERPL-CCNC: 23 U/L (ref 12–45)
BASE EXCESS BLDV CALC-SCNC: -2 MMOL/L
BASOPHILS # BLD AUTO: 0.5 % (ref 0–1.8)
BASOPHILS # BLD: 0.04 K/UL (ref 0–0.12)
BILIRUB SERPL-MCNC: 0.7 MG/DL (ref 0.1–1.5)
BODY TEMPERATURE: ABNORMAL CENTIGRADE
BUN SERPL-MCNC: 17 MG/DL (ref 8–22)
CALCIUM SERPL-MCNC: 9.1 MG/DL (ref 8.4–10.2)
CHLORIDE SERPL-SCNC: 91 MMOL/L (ref 96–112)
CO2 SERPL-SCNC: 21 MMOL/L (ref 20–33)
COVID ORDER STATUS COVID19: NORMAL
CREAT SERPL-MCNC: 0.88 MG/DL (ref 0.5–1.4)
D DIMER PPP IA.FEU-MCNC: 0.31 UG/ML (FEU) (ref 0–0.5)
EKG IMPRESSION: NORMAL
EOSINOPHIL # BLD AUTO: 0.06 K/UL (ref 0–0.51)
EOSINOPHIL NFR BLD: 0.8 % (ref 0–6.9)
ERYTHROCYTE [DISTWIDTH] IN BLOOD BY AUTOMATED COUNT: 38.9 FL (ref 35.9–50)
GLOBULIN SER CALC-MCNC: 2.8 G/DL (ref 1.9–3.5)
GLUCOSE SERPL-MCNC: 94 MG/DL (ref 65–99)
HCO3 BLDV-SCNC: 21 MMOL/L (ref 24–28)
HCT VFR BLD AUTO: 34.8 % (ref 42–52)
HGB BLD-MCNC: 12.4 G/DL (ref 14–18)
IMM GRANULOCYTES # BLD AUTO: 0.02 K/UL (ref 0–0.11)
IMM GRANULOCYTES NFR BLD AUTO: 0.3 % (ref 0–0.9)
LYMPHOCYTES # BLD AUTO: 2.35 K/UL (ref 1–4.8)
LYMPHOCYTES NFR BLD: 30.9 % (ref 22–41)
MAGNESIUM SERPL-MCNC: 2 MG/DL (ref 1.5–2.5)
MCH RBC QN AUTO: 30.9 PG (ref 27–33)
MCHC RBC AUTO-ENTMCNC: 35.6 G/DL (ref 33.7–35.3)
MCV RBC AUTO: 86.8 FL (ref 81.4–97.8)
MONOCYTES # BLD AUTO: 0.82 K/UL (ref 0–0.85)
MONOCYTES NFR BLD AUTO: 10.8 % (ref 0–13.4)
NEUTROPHILS # BLD AUTO: 4.32 K/UL (ref 1.82–7.42)
NEUTROPHILS NFR BLD: 56.7 % (ref 44–72)
NRBC # BLD AUTO: 0 K/UL
NRBC BLD-RTO: 0 /100 WBC
NT-PROBNP SERPL IA-MCNC: 179 PG/ML (ref 0–125)
PCO2 BLDV: 30.2 MMHG (ref 41–51)
PH BLDV: 7.46 [PH] (ref 7.31–7.45)
PLATELET # BLD AUTO: 233 K/UL (ref 164–446)
PMV BLD AUTO: 9 FL (ref 9–12.9)
PO2 BLDV: 64.2 MMHG (ref 25–40)
POTASSIUM SERPL-SCNC: 4.3 MMOL/L (ref 3.6–5.5)
PROT SERPL-MCNC: 6.8 G/DL (ref 6–8.2)
RBC # BLD AUTO: 4.01 M/UL (ref 4.7–6.1)
SAO2 % BLDV: 92.6 %
SODIUM SERPL-SCNC: 125 MMOL/L (ref 135–145)
TROPONIN T SERPL-MCNC: 12 NG/L (ref 6–19)
WBC # BLD AUTO: 7.6 K/UL (ref 4.8–10.8)

## 2020-07-16 PROCEDURE — 700102 HCHG RX REV CODE 250 W/ 637 OVERRIDE(OP): Performed by: EMERGENCY MEDICINE

## 2020-07-16 PROCEDURE — 85025 COMPLETE CBC W/AUTO DIFF WBC: CPT

## 2020-07-16 PROCEDURE — 700117 HCHG RX CONTRAST REV CODE 255: Performed by: EMERGENCY MEDICINE

## 2020-07-16 PROCEDURE — 83880 ASSAY OF NATRIURETIC PEPTIDE: CPT

## 2020-07-16 PROCEDURE — A9270 NON-COVERED ITEM OR SERVICE: HCPCS | Performed by: EMERGENCY MEDICINE

## 2020-07-16 PROCEDURE — 84484 ASSAY OF TROPONIN QUANT: CPT

## 2020-07-16 PROCEDURE — C9803 HOPD COVID-19 SPEC COLLECT: HCPCS | Performed by: EMERGENCY MEDICINE

## 2020-07-16 PROCEDURE — U0003 INFECTIOUS AGENT DETECTION BY NUCLEIC ACID (DNA OR RNA); SEVERE ACUTE RESPIRATORY SYNDROME CORONAVIRUS 2 (SARS-COV-2) (CORONAVIRUS DISEASE [COVID-19]), AMPLIFIED PROBE TECHNIQUE, MAKING USE OF HIGH THROUGHPUT TECHNOLOGIES AS DESCRIBED BY CMS-2020-01-R: HCPCS

## 2020-07-16 PROCEDURE — 82803 BLOOD GASES ANY COMBINATION: CPT

## 2020-07-16 PROCEDURE — 83735 ASSAY OF MAGNESIUM: CPT

## 2020-07-16 PROCEDURE — 80053 COMPREHEN METABOLIC PANEL: CPT

## 2020-07-16 PROCEDURE — 93005 ELECTROCARDIOGRAM TRACING: CPT | Performed by: EMERGENCY MEDICINE

## 2020-07-16 PROCEDURE — 99284 EMERGENCY DEPT VISIT MOD MDM: CPT

## 2020-07-16 PROCEDURE — 71275 CT ANGIOGRAPHY CHEST: CPT

## 2020-07-16 PROCEDURE — 85379 FIBRIN DEGRADATION QUANT: CPT

## 2020-07-16 RX ORDER — LORAZEPAM 1 MG/1
0.5 TABLET ORAL ONCE
Status: COMPLETED | OUTPATIENT
Start: 2020-07-16 | End: 2020-07-16

## 2020-07-16 RX ORDER — ALBUTEROL SULFATE 90 UG/1
2 AEROSOL, METERED RESPIRATORY (INHALATION) ONCE
Status: COMPLETED | OUTPATIENT
Start: 2020-07-16 | End: 2020-07-16

## 2020-07-16 RX ADMIN — IOHEXOL 68 ML: 350 INJECTION, SOLUTION INTRAVENOUS at 22:01

## 2020-07-16 RX ADMIN — ALBUTEROL SULFATE 2 PUFF: 90 AEROSOL, METERED RESPIRATORY (INHALATION) at 20:52

## 2020-07-16 RX ADMIN — LORAZEPAM 0.5 MG: 1 TABLET ORAL at 20:41

## 2020-07-16 ASSESSMENT — FIBROSIS 4 INDEX: FIB4 SCORE: 1.67

## 2020-07-17 LAB
SARS-COV-2 RNA RESP QL NAA+PROBE: NOTDETECTED
SPECIMEN SOURCE: NORMAL

## 2020-07-17 NOTE — DISCHARGE INSTRUCTIONS
You were evaluated in the Emergency department today for symptoms of a lower respiratory illness (fever, cough, shortness of breath).  Your evaluation may have included tests for viruses and you may have had a chest x-ray and/or received medications for your symptoms.  At this time, no clear cause of your symptoms has been identified.    At this time you do not meet our [facility] criteria for dedicated testing for COVID-19.    Please follow these instructions:    -- Perform basic infection control measures at home. These include frequent handwashing, avoiding touching your face, coughing or sneezing into a tissue, wearing a mask if you were advised to do so, and staying home from work or school if you have a persistent fever or cough. If you need a work or school note, please talk to your provider.    -- Drink plenty of fluids to stay hydrated, take any medications you were prescribed, and use over-the-counter medications for fever or body aches.  If you feel that you may have fever, it is a good idea to take your temperature at home and document the readings.  A temperature measurement of greater than 100.4 °F means that you have a fever.  For fevers, you may take 500 to 1000 mg of acetaminophen (Tylenol).  Please be careful that you do not take more than 4000 mg of acetaminophen per day; some over-the-counter medications for coughs and colds, including DayQuil or NyQuil, contain acetaminophen.

## 2020-07-17 NOTE — ED TRIAGE NOTES
"Pt to er with 2-3 week hx of sob, requiring oxygen use,. Pt denies smoking hx or hx of copd and that pcp \"must have called in prescription for oxygen because it got delivered\" to his house. Currently on 5L with sat=97%, denies  known covid exposure or recent travel  . Med rec completed in triage, resp meds noted, pt denies use of same. Per epci, pt has had sob for approx 2 months, followed by cardiology (francoise) and pulm (dr matias)for same  "

## 2020-07-17 NOTE — ED NOTES
Pt given discharge instructions. RN answered questions. VSS. Pt ambulated steadily out to ER lobby with own home O2

## 2020-07-17 NOTE — ED NOTES
COVID-19 Test Follow Up  07/17/20      Patient tested negative for COVID-19. I have informed the patient of the result by My Chart. Encouraged to stay at home until no fever for 72 hours without the use of fever reducing medications and symptoms improving. Informed there is no need to further self-isolate for 14 days for COVID-19 unless otherwise directed by the Health Dept.     SARS-CoV-2 Source  NP Swab   V    SARS-CoV-2 by PCR  NotDetected         Mala Vasquez, PharmD

## 2020-07-17 NOTE — ED PROVIDER NOTES
ED Provider Note    CHIEF COMPLAINT  Chief Complaint   Patient presents with   • Shortness of Breath     x 2-3 weeks, started on xoygen for same, and not feeling better       HPI  Mr. Rose is an 80-year-old male who presents the emergency room for evaluation of shortness of breath.  The patient states that he has been having shortness of breath for several months now.  He notes that he has been seen in the emergency department and by his doctor and is pulmonologist, all of which have had unremarkable results.  He had formal pulmonary function test but with his pervasive sensation of shortness of breath he was started on oxygen.  He checks his pulse ox at home and has no desaturations.  He denies any recent fevers, chills, hemoptysis.  He denies any cough.  He does not have a formal history of COPD or CHF/MI.  He has never smoked.  He has not had any DVT or PEs in the past.  He does report that since everything has started he has been very sedentary and feels like his symptoms are getting worse.  He denies any other acute concerns or complaints at this time.  He does have a history of anxiety for which he takes oral Ativan intermittently.    PPE Note: I personally donned full PPE for all patient encounters during this visit, including being clean-shaven with an N95 respirator mask, gloves, and goggles.       REVIEW OF SYSTEMS  See HPI for further details. All other systems are negative.     PAST MEDICAL HISTORY   has a past medical history of Anesthesia, Arthritis, BPH (benign prostatic hyperplasia), Breath shortness, CAD (coronary artery disease), Cataract, Dental disorder, Environmental and seasonal allergies, Heart burn, High cholesterol, Indigestion, Shortness of breath, and Wears glasses.    SOCIAL HISTORY  Social History     Tobacco Use   • Smoking status: Never Smoker   • Smokeless tobacco: Never Used   Substance and Sexual Activity   • Alcohol use: Yes     Alcohol/week: 0.0 oz     Comment: 2 per month   •  "Drug use: No   • Sexual activity: Not on file     SURGICAL HISTORY   has a past surgical history that includes cervical laminectomy posterior; knee replacement, total (Left, 2008); tonsillectomy; shoulder arthroscopy (Left, 1990's); cholecystectomy (1970's); cervical disk and fusion anterior (8/11/2016); and turp-vapor (11/8/2018).    CURRENT MEDICATIONS  Home Medications     Reviewed by Jewels Huynh R.N. (Registered Nurse) on 07/16/20 at [facility]  Med List Status: Partial   Medication Last Dose Status   albuterol 108 (90 Base) MCG/ACT Aero Soln inhalation aerosol not taking Active   aspirin EC (ECOTRIN) 81 MG Tablet Delayed Response 7/16/2020 Active   atorvastatin (LIPITOR) 40 MG Tab 7/16/2020 Active   ibuprofen (MOTRIN) 800 MG Tab prn Active   ipratropium-albuterol (DUONEB) 0.5-2.5 (3) MG/3ML nebulizer solution  Active   LORazepam (ATIVAN) 0.5 MG Tab  Active   LORazepam (ATIVAN) 1 MG Tab 7/16/2020 Active   sertraline (ZOLOFT) 25 MG tablet not taking Active              ALLERGIES  No Known Allergies    PHYSICAL EXAM  VITAL SIGNS: /83   Pulse 68   Temp 37 °C (98.6 °F) (Temporal)   Resp 20   Ht 1.956 m (6' 5\")   Wt 91.2 kg (201 lb 1 oz)   SpO2 100%   BMI 23.84 kg/m²   Pulse ox interpretation: I interpret this pulse ox as normal.  Genl: M sitting in chair comfortably, speaking clearly, appears anxious but in no acute distress   Head: NC/AT   ENT: Mucous membranes moist, posterior pharynx clear, uvula midline, nares patent bilaterally   Eyes: Normal sclera, pupils equal round reactive to light  Neck: Supple, FROM, no LAD appreciated   Pulmonary: Lungs are clear to auscultation bilaterally, no wheezes, slight decreased air movement in RLL, may be due to decreased effort  Chest: No TTP  CV:  RRR, no murmur appreciated, pulses 2+ in both upper and lower extremities,  Abdomen: soft, NT/ND; no rebound/guarding, no masses palpated, no HSM   : no CVA or suprapubic tenderness   Musculoskeletal: Pain free ROM of " the neck. Moving upper and lower extremities and spontaneous in coordinated fashion  Neuro: A&Ox4 (person, place, time, situation), speech fluent, gait steady, no focal deficits appreciated, No cerebellar signs. Sensation is grossly intact in the distal upper and lower extremities.  5/5 strength in  and dorsiflexion/plantar flexion of the ankles  Psych: Patient has an appropriate affect and behavior  Skin: No rash or lesions.  No pallor or jaundice.  No cyanosis.  Warm and dry.     DIAGNOSTIC STUDIES / PROCEDURES    EKG  EKG at 2100 shows: rate of 64, rhythm sinus, axis normal, intervals normal, QRS narrow, ST/T waves without acute ischemia or infarction.  Machine labels rhythm as flutter, this is not present    LABS  Labs Reviewed   CBC WITH DIFFERENTIAL - Abnormal; Notable for the following components:       Result Value    RBC 4.01 (*)     Hemoglobin 12.4 (*)     Hematocrit 34.8 (*)     MCHC 35.6 (*)     All other components within normal limits    Narrative:     1. Age less then 50  2. Heart reate less then 100  3. 02 sat > 94%  4. No prior history of DVT or PE  5. No recent trauma or surgery (2 weeks)  6. No hemoptisis.  7. No  or HRP  8. No un-lateral leg swelling.   COMP METABOLIC PANEL - Abnormal; Notable for the following components:    Sodium 125 (*)     Chloride 91 (*)     All other components within normal limits    Narrative:     1. Age less then 50  2. Heart reate less then 100  3. 02 sat > 94%  4. No prior history of DVT or PE  5. No recent trauma or surgery (2 weeks)  6. No hemoptisis.  7. No  or HRP  8. No un-lateral leg swelling.   PROBRAIN NATRIURETIC PEPTIDE, NT - Abnormal; Notable for the following components:    NT-proBNP 179 (*)     All other components within normal limits    Narrative:     1. Age less then 50  2. Heart reate less then 100  3. 02 sat > 94%  4. No prior history of DVT or PE  5. No recent trauma or surgery (2 weeks)  6. No hemoptisis.  7. No  or HRP  8. No  un-lateral leg swelling.   VENOUS BLOOD GAS - Abnormal; Notable for the following components:    Venous Bg Ph 7.46 (*)     Venous Bg Pco2 30.2 (*)     Venous Bg Po2 64.2 (*)     Venous Bg Hco3 21 (*)     All other components within normal limits    Narrative:     1. Age less then 50  2. Heart reate less then 100  3. 02 sat > 94%  4. No prior history of DVT or PE  5. No recent trauma or surgery (2 weeks)  6. No hemoptisis.  7. No  or HRP  8. No un-lateral leg swelling.   TROPONIN    Narrative:     1. Age less then 50  2. Heart reate less then 100  3. 02 sat > 94%  4. No prior history of DVT or PE  5. No recent trauma or surgery (2 weeks)  6. No hemoptisis.  7. No  or HRP  8. No un-lateral leg swelling.   MAGNESIUM    Narrative:     1. Age less then 50  2. Heart reate less then 100  3. 02 sat > 94%  4. No prior history of DVT or PE  5. No recent trauma or surgery (2 weeks)  6. No hemoptisis.  7. No  or HRP  8. No un-lateral leg swelling.   D-DIMER    Narrative:     1. Age less then 50  2. Heart reate less then 100  3. 02 sat > 94%  4. No prior history of DVT or PE  5. No recent trauma or surgery (2 weeks)  6. No hemoptisis.  7. No  or HRP  8. No un-lateral leg swelling.   COVID/SARS COV-2    Narrative:     Is patient being admitted?->No  Expected turn around time?->Routine (In-House PCR up to 24  hours)   ESTIMATED GFR    Narrative:     1. Age less then 50  2. Heart reate less then 100  3. 02 sat > 94%  4. No prior history of DVT or PE  5. No recent trauma or surgery (2 weeks)  6. No hemoptisis.  7. No  or HRP  8. No un-lateral leg swelling.     RADIOLOGY  CT-CTA CHEST PULMONARY ARTERY W/ RECONS   Final Result         1.  No pulmonary embolus appreciated.   2.  Calcified right hilar lymph nodes, consider granulomatous pulmonary disease.   3.  Esophageal dilatation, consider gastroesophageal reflux or achalasia.   4.  Atherosclerosis and atherosclerotic coronary artery disease.        COURSE & MEDICAL  DECISION MAKING  Pertinent Labs & Imaging studies reviewed. (See chart for details)    DDX:  Pneumothorax  Pulmonary embolism  Pneumonia  COPD exacerbation  Asthma exacerbation  CHF exacerbation  Rib fractures  ACS  Anxiety  Foreign body  COVID    MDM    Initial evaluation at 2019:    Presented to the emergency room for the symptoms as described above.  He has been seen on multiple occurrences for worsening dyspnea.  On initial assessment he is in no acute distress, is not having any hypoxia when transition to room air but is slightly tachypneic.  He does appear slightly anxious on clinical exam and the broad differential noted above is considered.  Lung exam is clear without wheezing and possible decreased air movement on the right lower side though this was appreciated more to decreased effort and habitus.  Previously he has normal pulmonary function tests and is well-established.  With some reduced air movement, the initial dose of albuterol was given and the patient reported very scant improvement.  Chest x-ray shows no acute abnormalities, consolidations, cardiomegaly or pulmonary edema.  I have considered PE and with an increased dimer, worsening dyspnea and tachypnea and the fact that the patient has been very sedentary since previous evaluation we had informed discussion and the patient has consented to a CTA of the chest.  This demonstrated no acute pulmonary embolus and findings that were seen previously including atherosclerosis and granulomatous disease.  The patient does not have classical cardiac etiology and symptoms.  He has no peripheral swelling, no murmur, and EKG that is unchanged from previous and BMP and troponin are unremarkable.      Following initial assessment he was treated with a small dose of oral Ativan and felt moderate improvement.  Repeat assessment showed no interval changes and no hypoxia.  I discussed the findings, the lack of acute infectious etiology and no signs of acute clot.   He feels comfortable at this time following up with his established primary care physician.  I discussed my concerns regarding his frequent checks, his elevated anxiety regarding his dyspnea and given very strict return precautions should he have evolving changes, globalized swelling, headaches or other clinical changes.  Questions are addressed at the bedside and he is discharged home in stable condition    HTN/IDDM FOLLOW UP:  The patient is referred to a primary physician for blood pressure management, diabetic screening, and for all other preventive health concerns    FINAL IMPRESSION  Visit Diagnoses     ICD-10-CM   1. Dyspnea, unspecified type  R06.00   2. Chronic shortness of breath  R06.02   3. Anxiousness  F41.9     Electronically signed by: Vipin Dial M.D., 7/16/2020 8:20 PM

## 2020-07-20 ENCOUNTER — TELEPHONE (OUTPATIENT)
Dept: CARDIOLOGY | Facility: MEDICAL CENTER | Age: 80
End: 2020-07-20

## 2020-07-20 DIAGNOSIS — R93.1 ABNORMAL NUCLEAR CARDIAC IMAGING TEST: ICD-10-CM

## 2020-07-20 DIAGNOSIS — R06.02 SOB (SHORTNESS OF BREATH): ICD-10-CM

## 2020-07-20 DIAGNOSIS — R26.2 AMBULATORY DYSFUNCTION: ICD-10-CM

## 2020-07-20 DIAGNOSIS — I25.10 CORONARY ARTERY CALCIFICATION SEEN ON CAT SCAN: ICD-10-CM

## 2020-07-20 NOTE — TELEPHONE ENCOUNTER
Called and spoke with the patient and his wife.  Continues to have problems with shortness of breath with and without exertion.  Was seen in ER 7/17/2020.  Troponin levels normal.  Chest CTA showed granulomatous disease dense coronary calcification but no PE.  I reviewed the images of the chest CT scan which shows stents calcification the proximal circumflex and LAD distribution.  I had a previous MPI that was felt to be low risk 2 years ago at [facility] with Dr. Lima.  Echocardiogram was technically difficult showed grossly normal EF with mild MVP with mild eccentric AR though may be worse.  We will proceed with a repeat pharmacologic MPI.

## 2020-07-23 ENCOUNTER — TELEPHONE (OUTPATIENT)
Dept: CARDIOLOGY | Facility: MEDICAL CENTER | Age: 80
End: 2020-07-23

## 2020-07-23 ENCOUNTER — HOSPITAL ENCOUNTER (OUTPATIENT)
Dept: RADIOLOGY | Facility: MEDICAL CENTER | Age: 80
End: 2020-07-23
Attending: INTERNAL MEDICINE
Payer: MEDICARE

## 2020-07-23 DIAGNOSIS — R26.2 AMBULATORY DYSFUNCTION: ICD-10-CM

## 2020-07-23 DIAGNOSIS — R06.02 SOB (SHORTNESS OF BREATH): ICD-10-CM

## 2020-07-23 DIAGNOSIS — I25.10 CORONARY ARTERY CALCIFICATION SEEN ON CAT SCAN: ICD-10-CM

## 2020-07-23 DIAGNOSIS — R93.1 ABNORMAL NUCLEAR CARDIAC IMAGING TEST: ICD-10-CM

## 2020-07-23 PROCEDURE — 93018 CV STRESS TEST I&R ONLY: CPT | Performed by: INTERNAL MEDICINE

## 2020-07-23 PROCEDURE — A9502 TC99M TETROFOSMIN: HCPCS

## 2020-07-23 PROCEDURE — 78452 HT MUSCLE IMAGE SPECT MULT: CPT | Mod: 26 | Performed by: INTERNAL MEDICINE

## 2020-07-23 PROCEDURE — 700111 HCHG RX REV CODE 636 W/ 250 OVERRIDE (IP)

## 2020-07-23 RX ORDER — REGADENOSON 0.08 MG/ML
INJECTION, SOLUTION INTRAVENOUS
Status: COMPLETED
Start: 2020-07-23 | End: 2020-07-23

## 2020-07-23 RX ORDER — REGADENOSON 0.08 MG/ML
0.4 INJECTION, SOLUTION INTRAVENOUS ONCE
Status: COMPLETED | OUTPATIENT
Start: 2020-07-23 | End: 2020-07-23

## 2020-07-23 RX ADMIN — REGADENOSON 0.4 MG: 0.08 INJECTION, SOLUTION INTRAVENOUS at 10:58

## 2020-07-24 NOTE — TELEPHONE ENCOUNTER
"Notify the patient that his nuclear MPI is normal indicating no coronary obstructive \"blockage\" to explain his shortness of breath.  "

## 2020-07-27 NOTE — TELEPHONE ENCOUNTER
Spoke to patient and discussed results. Patient verbalized understanding and was appreciative for the phone call.

## 2020-07-29 ENCOUNTER — APPOINTMENT (OUTPATIENT)
Dept: PULMONOLOGY | Facility: HOSPICE | Age: 80
End: 2020-07-29
Payer: MEDICARE

## 2020-08-05 ENCOUNTER — OFFICE VISIT (OUTPATIENT)
Dept: PULMONOLOGY | Facility: HOSPICE | Age: 80
End: 2020-08-05
Payer: MEDICARE

## 2020-08-05 VITALS
SYSTOLIC BLOOD PRESSURE: 106 MMHG | TEMPERATURE: 98.2 F | BODY MASS INDEX: 23.38 KG/M2 | WEIGHT: 198 LBS | RESPIRATION RATE: 16 BRPM | DIASTOLIC BLOOD PRESSURE: 66 MMHG | HEIGHT: 77 IN | HEART RATE: 60 BPM | OXYGEN SATURATION: 100 %

## 2020-08-05 DIAGNOSIS — J45.30 MILD PERSISTENT ASTHMA WITHOUT COMPLICATION: ICD-10-CM

## 2020-08-05 DIAGNOSIS — Z77.090 ASBESTOS EXPOSURE: ICD-10-CM

## 2020-08-05 PROCEDURE — 99214 OFFICE O/P EST MOD 30 MIN: CPT | Performed by: INTERNAL MEDICINE

## 2020-08-05 RX ORDER — GABAPENTIN 100 MG/1
100 CAPSULE ORAL 2 TIMES DAILY
COMMUNITY
End: 2022-01-03

## 2020-08-05 ASSESSMENT — FIBROSIS 4 INDEX: FIB4 SCORE: 1.83

## 2020-08-05 NOTE — PROGRESS NOTES
Nathan Rose is a 80 y.o. male here for shortness of breath and dyspnea on exertion with extensive recent evaluation. Patient was referred by primary care.    History of Present Illness:      Comes in today with his daughter to follow-up on his shortness of breath.  Remarkably, his lung function test at 80 years of age is entirely normal.  Spirometry is normal, lung volumes are normal and oxygen transfer is normal.  He does use supplemental oxygen but today on pulse 5 L he is 100%.    Recent CT pulmonary angiogram showed no evidence of pulmonary emboli, he has some old calcified granulomas that are not significant, but no active process was seen.  Extensive cardiac evaluation was unrevealing.    He has components of anxiety, hyperventilation, sitting here in the office today he is overbreathing and I try to get him to slow this down by using pursed lip breathing techniques.  I do not think adding additional antianxiety medicines make sense, we will have to talk this through be aware of it use pursed lip breathing techniques but bottom line is normal lung function, exercise desaturation noted, no evidence of pulmonary emboli, clear lung fields without wheezing.  I will keep track of him on a periodic basis.    Constitutional ROS: No unexpected change in weight, No unexplained fevers  Eyes: No change in vision or blurring or double vision  Mouth/Throat ROS: No sore throat, No recent change in voice or hoarseness  Pulmonary ROS: See present history for pertinent positives  Cardiovascular ROS: No chest pain to suggest acute coronary syndrome  Gastrointestinal ROS: No abdominal pain to suggest peptic disease  Musculoskeletal/Extremities ROS: no acute artritis or unusual swelling  Hematologic/Lymphatic ROS: No easy bleeding or unusual lymph node swelling  Neurologic ROS: No new or unusual weakness  Anxiety, hyperventilation see discussion above      Current Outpatient Medications   Medication Sig Dispense Refill   •  gabapentin (NEURONTIN) 100 MG Cap Take 100 mg by mouth 2 Times a Day.     • ipratropium-albuterol (DUONEB) 0.5-2.5 (3) MG/3ML nebulizer solution USE 1 AMPULE IN NEBULIZER 4 TIMES DAILY AS NEEDED FOR COUGH AND FOR SHORTNESS OF BREATH     • ibuprofen (MOTRIN) 800 MG Tab Take 400 mg by mouth 2 times a day as needed for Mild Pain. 0.5 tablet = 400 mg     • atorvastatin (LIPITOR) 40 MG Tab Take 1 Tab by mouth every day. 90 Tab 2   • albuterol 108 (90 Base) MCG/ACT Aero Soln inhalation aerosol Inhale 1-2 Puffs by mouth every four hours as needed (shortness of breath, bronchospasm, wheezing). 1 Inhaler 0   • aspirin EC (ECOTRIN) 81 MG Tablet Delayed Response Take 81 mg by mouth every evening.     • sertraline (ZOLOFT) 25 MG tablet Take  by mouth.     • LORazepam (ATIVAN) 1 MG Tab      • LORazepam (ATIVAN) 0.5 MG Tab Take 0.5 mg by mouth 2 times a day as needed for Anxiety.       No current facility-administered medications for this visit.        Social History     Tobacco Use   • Smoking status: Never Smoker   • Smokeless tobacco: Never Used   Substance Use Topics   • Alcohol use: Yes     Alcohol/week: 0.0 oz     Comment: 2 per month   • Drug use: No        Past Medical History:   Diagnosis Date   • Anesthesia     PONV   • Arthritis     generalized   • BPH (benign prostatic hyperplasia)    • Breath shortness    • CAD (coronary artery disease)    • Cataract     left eye,not removed   • Dental disorder     upper denture   • Environmental and seasonal allergies    • Heart burn    • High cholesterol    • Indigestion    • Shortness of breath    • Wears glasses        Past Surgical History:   Procedure Laterality Date   • TURP-VAPOR  11/8/2018    Procedure: TURP-VAPOR - GREENLIGHT LASER;  Surgeon: Grey Espinoza M.D.;  Location: SURGERY [facility];  Service: Urology   • CERVICAL DISK AND FUSION ANTERIOR  8/11/2016    Procedure: CERVICAL DISK AND FUSION ANTERIOR C3-5 and  Excision of bridging ostephytes C3-4,C4-5,C5-6  ";  Surgeon: Abimael Rock M.D.;  Location: SURGERY [facility];  Service:    • KNEE REPLACEMENT, TOTAL Left 2008    Partial left   • SHOULDER ARTHROSCOPY Left 1990's   • CHOLECYSTECTOMY  1970's   • CERVICAL LAMINECTOMY POSTERIOR     • TONSILLECTOMY         Allergies: Patient has no known allergies.    Family History   Problem Relation Age of Onset   • Heart Disease Mother    • Heart Disease Father    • Heart Failure Father        Physical Examination    Vitals:    08/05/20 1016 08/05/20 1019   Height: 1.956 m (6' 5\")    Weight: 89.8 kg (198 lb)    Weight % change since last entry.: 0 %    BP: 106/66    Pulse: 60    BMI (Calculated): 23.48    Resp: 16    Temp: 36.8 °C (98.2 °F)    TempSrc: Temporal    O2 sat % on O2:  100 %   O2 Flow Rate (L/min):  5       General Appearance: alert, no distress  Skin: Skin color, texture, turgor normal. No rashes or lesions.  Eyes: negative  Oropharynx: Lips, mucosa, and tongue normal. Teeth and gums normal. Oropharynx moist and without lesion  Lungs: positive findings: Markedly quiet and clear  Heart: negative. RRR without murmur, gallop, or rubs.  No ectopy.  Abdomen: Abdomen soft, non-tender. . No masses,  No organomegaly  Extremities:  No deformities, edema, or skin discoloration  Joints: No acute arthritis  Peripheral Pulses:perfused  Neurologic: intact grossly  No clubbing or cyanosis    II (soft palate, uvula, fauces visible)    Imaging: No abnormalities on recent CT chest    PFTS: Remarkably normal spirometry volumes and oxygen transfer      Assessment and Plan  1. Mild persistent asthma without complication  No active bronchospasm presently    2. Asbestos exposure  No signs of pleural plaques nodules or masses    This patient has significant hyperventilation, components of anxiety, remarkably good saturations, excellent lung function testing, and his daughter will work with him to try to get him to utilize pursed lip breathing techniques to get control of these " situations.    Followup Return in about 6 months (around 2/5/2021) for follow up visit with Dr. Kristal Ruiz.

## 2020-08-05 NOTE — PATIENT INSTRUCTIONS
Comes in today with his daughter to follow-up on his shortness of breath.  Remarkably, his lung function test at 80 years of age is entirely normal.  Spirometry is normal, lung volumes are normal and oxygen transfer is normal.  He does use supplemental oxygen but today on pulse 5 L he is 100%.    Recent CT pulmonary angiogram showed no evidence of pulmonary emboli, he has some old calcified granulomas that are not significant, but no active process was seen.  Extensive cardiac evaluation was unrevealing.    He has components of anxiety, hyperventilation, sitting here in the office today he is overbreathing and I try to get him to slow this down by using pursed lip breathing techniques.  I do not think adding additional antianxiety medicines make sense, we will have to talk this through be aware of it use pursed lip breathing techniques but bottom line is normal lung function, exercise desaturation noted, no evidence of pulmonary emboli, clear lung fields without wheezing.  I will keep track of him on a periodic basis.

## 2020-08-18 ENCOUNTER — HOSPITAL ENCOUNTER (OUTPATIENT)
Dept: RADIOLOGY | Facility: MEDICAL CENTER | Age: 80
End: 2020-08-18
Attending: PSYCHIATRY & NEUROLOGY
Payer: MEDICARE

## 2020-08-18 DIAGNOSIS — R20.3 HYPERESTHESIA: ICD-10-CM

## 2020-08-18 DIAGNOSIS — F03.90 SENILE DEMENTIA, UNCOMPLICATED (HCC): ICD-10-CM

## 2020-08-26 ENCOUNTER — HOSPITAL ENCOUNTER (OUTPATIENT)
Dept: LAB | Facility: MEDICAL CENTER | Age: 80
End: 2020-08-26
Attending: FAMILY MEDICINE
Payer: MEDICARE

## 2020-08-26 LAB
APPEARANCE UR: CLEAR
BILIRUB UR QL STRIP.AUTO: NEGATIVE
COLOR UR: YELLOW
GLUCOSE UR STRIP.AUTO-MCNC: NEGATIVE MG/DL
KETONES UR STRIP.AUTO-MCNC: NEGATIVE MG/DL
LEUKOCYTE ESTERASE UR QL STRIP.AUTO: NEGATIVE
MICRO URNS: NORMAL
NITRITE UR QL STRIP.AUTO: NEGATIVE
PH UR STRIP.AUTO: 6.5 [PH] (ref 5–8)
PROT UR QL STRIP: NEGATIVE MG/DL
RBC UR QL AUTO: NEGATIVE
SP GR UR STRIP.AUTO: 1.01
UROBILINOGEN UR STRIP.AUTO-MCNC: 0.2 MG/DL

## 2020-08-26 PROCEDURE — 81003 URINALYSIS AUTO W/O SCOPE: CPT

## 2021-01-14 DIAGNOSIS — Z23 NEED FOR VACCINATION: ICD-10-CM

## 2021-01-26 ENCOUNTER — IMMUNIZATION (OUTPATIENT)
Dept: FAMILY PLANNING/WOMEN'S HEALTH CLINIC | Facility: IMMUNIZATION CENTER | Age: 81
End: 2021-01-26
Attending: INTERNAL MEDICINE
Payer: MEDICARE

## 2021-01-26 DIAGNOSIS — Z23 ENCOUNTER FOR VACCINATION: Primary | ICD-10-CM

## 2021-01-26 DIAGNOSIS — Z23 NEED FOR VACCINATION: ICD-10-CM

## 2021-01-26 PROCEDURE — 91300 PFIZER SARS-COV-2 VACCINE: CPT

## 2021-01-26 PROCEDURE — 0001A PFIZER SARS-COV-2 VACCINE: CPT

## 2021-02-18 ENCOUNTER — IMMUNIZATION (OUTPATIENT)
Dept: FAMILY PLANNING/WOMEN'S HEALTH CLINIC | Facility: IMMUNIZATION CENTER | Age: 81
End: 2021-02-18
Attending: INTERNAL MEDICINE
Payer: MEDICARE

## 2021-02-18 DIAGNOSIS — Z23 ENCOUNTER FOR VACCINATION: Primary | ICD-10-CM

## 2021-02-18 PROCEDURE — 0002A PFIZER SARS-COV-2 VACCINE: CPT

## 2021-02-18 PROCEDURE — 91300 PFIZER SARS-COV-2 VACCINE: CPT

## 2021-06-02 DIAGNOSIS — E78.5 DYSLIPIDEMIA: ICD-10-CM

## 2021-06-08 RX ORDER — ATORVASTATIN CALCIUM 40 MG/1
40 TABLET, FILM COATED ORAL DAILY
Qty: 90 TABLET | Refills: 0 | Status: SHIPPED | OUTPATIENT
Start: 2021-06-08 | End: 2021-09-27

## 2021-06-30 ENCOUNTER — TELEPHONE (OUTPATIENT)
Dept: CARDIOLOGY | Facility: MEDICAL CENTER | Age: 81
End: 2021-06-30

## 2021-06-30 NOTE — TELEPHONE ENCOUNTER
PT scheduled.   [facility]    ----- Message from Nathan Rose sent at 6/28/2021  4:58 PM PDT -----  Regarding: [facility]  Contact: [phone]  Sorry I have miised your attempts to contact. I would like to schedule an appointment whenever is convenient. Anytime in the next two weeks is fine  Thank you, Nathan Rose

## 2021-09-27 DIAGNOSIS — E78.5 DYSLIPIDEMIA: ICD-10-CM

## 2021-09-27 RX ORDER — ATORVASTATIN CALCIUM 40 MG/1
40 TABLET, FILM COATED ORAL DAILY
Qty: 30 TABLET | Refills: 0 | Status: SHIPPED | OUTPATIENT
Start: 2021-09-27 | End: 2022-01-10

## 2021-09-27 NOTE — TELEPHONE ENCOUNTER
Pt needs to be seen, Last refill was 90 with note to make an apt, no apt made, refill made accordingly today, Letter sent with labs and my chart updated.

## 2021-09-27 NOTE — LETTER
September 27, 2021        Nathan Rose  [address] [place] [address]        Dear Nathan:      We received a medication refill request, and it looks like you're due for an annual visit and annual labs. In order to continue filling your medications safely, please call [phone] to schedule a visit with Pérez Torres M.D. and have labs drawn 1-2 weeks prior to the appointment.     Let us know if you have any questions.    Thank you,  Ivet NELSON            Electronically Signed

## 2021-12-30 ENCOUNTER — TELEPHONE (OUTPATIENT)
Dept: CARDIOLOGY | Facility: PHYSICIAN GROUP | Age: 81
End: 2021-12-30

## 2021-12-30 NOTE — TELEPHONE ENCOUNTER
----- Message from Beata Ornelas, Med Ass't sent at 12/30/2021 11:45 AM PST -----  Regarding: LAB ORDER  Good Morning,     Pt called, is looking for a lab order, before his upcoming appointment on 1/3/22    Thank you,   Beata BOYD

## 2021-12-31 ENCOUNTER — HOSPITAL ENCOUNTER (OUTPATIENT)
Dept: LAB | Facility: MEDICAL CENTER | Age: 81
End: 2021-12-31
Attending: INTERNAL MEDICINE
Payer: MEDICARE

## 2021-12-31 DIAGNOSIS — E78.5 DYSLIPIDEMIA: ICD-10-CM

## 2021-12-31 LAB
CHOLEST SERPL-MCNC: 115 MG/DL (ref 100–199)
FASTING STATUS PATIENT QL REPORTED: NORMAL
HDLC SERPL-MCNC: 47 MG/DL
LDLC SERPL CALC-MCNC: 53 MG/DL
TRIGL SERPL-MCNC: 75 MG/DL (ref 0–149)

## 2021-12-31 PROCEDURE — 36415 COLL VENOUS BLD VENIPUNCTURE: CPT

## 2021-12-31 PROCEDURE — 80061 LIPID PANEL: CPT

## 2022-01-03 ENCOUNTER — TELEMEDICINE (OUTPATIENT)
Dept: CARDIOLOGY | Facility: MEDICAL CENTER | Age: 82
End: 2022-01-03
Payer: MEDICARE

## 2022-01-03 VITALS
HEART RATE: 70 BPM | WEIGHT: 190 LBS | DIASTOLIC BLOOD PRESSURE: 60 MMHG | HEIGHT: 77 IN | SYSTOLIC BLOOD PRESSURE: 120 MMHG | BODY MASS INDEX: 22.43 KG/M2

## 2022-01-03 DIAGNOSIS — I34.0 MITRAL VALVE INSUFFICIENCY, UNSPECIFIED ETIOLOGY: ICD-10-CM

## 2022-01-03 DIAGNOSIS — I25.10 CORONARY ARTERY CALCIFICATION SEEN ON CAT SCAN: ICD-10-CM

## 2022-01-03 DIAGNOSIS — E78.5 DYSLIPIDEMIA: ICD-10-CM

## 2022-01-03 PROBLEM — R06.02 SOB (SHORTNESS OF BREATH): Status: RESOLVED | Noted: 2020-07-01 | Resolved: 2022-01-03

## 2022-01-03 PROCEDURE — 99214 OFFICE O/P EST MOD 30 MIN: CPT | Mod: 95 | Performed by: INTERNAL MEDICINE

## 2022-01-03 RX ORDER — TAMSULOSIN HYDROCHLORIDE 0.4 MG/1
CAPSULE ORAL
COMMUNITY
Start: 2021-10-29 | End: 2022-01-03

## 2022-01-03 RX ORDER — ESCITALOPRAM OXALATE 5 MG/1
TABLET ORAL
COMMUNITY
Start: 2020-02-05 | End: 2022-01-13 | Stop reason: SDUPTHER

## 2022-01-03 RX ORDER — AMOXICILLIN 500 MG/1
CAPSULE ORAL
COMMUNITY
End: 2022-01-03

## 2022-01-03 RX ORDER — CHLORDIAZEPOXIDE HYDROCHLORIDE 25 MG/1
CAPSULE, GELATIN COATED ORAL
COMMUNITY
Start: 2020-08-18

## 2022-01-03 RX ORDER — TAMSULOSIN HYDROCHLORIDE 0.4 MG/1
CAPSULE ORAL
COMMUNITY
Start: 2021-09-09

## 2022-01-03 ASSESSMENT — ENCOUNTER SYMPTOMS
SHORTNESS OF BREATH: 0
DIZZINESS: 0
LOSS OF CONSCIOUSNESS: 0
COUGH: 0
MYALGIAS: 0
PALPITATIONS: 0

## 2022-01-03 ASSESSMENT — FIBROSIS 4 INDEX: FIB4 SCORE: 1.86

## 2022-01-03 NOTE — PROGRESS NOTES
"Chief Complaint   Patient presents with   • Coronary Artery Disease     F/V Dx: Coronary artery disease with angina pectoris, unspecified vessel or lesion type, unspecified whether native or transplanted heart (HCC)       Subjective     Nathan Rose is a 81 y.o. male who presents today for coronary calcification, dyslipidemia and mitral regurgitation.     This evaluation was conducted via Zoom using secure and encrypted videoconferencing technology. The patient was in a private location in the [facility].    The patient's identity was confirmed and verbal consent was obtained for this virtual visit.  Session began 10:42 AM, ended 10:49 AM, total time 7 minutes.    Since 2020 appointment the patient denies any cardiac symptoms at this time.  Specifically denies SOB, chest pain or palpitations.  Started on Flomax and Ativan, the latter for anxiety.    Past medical history  The patient tells me that he has had 2 previous angiograms the last one by Dr. Macedo in [place] \"20 years ago\" at which time there was no evidence of blockage.  He has been on long-term cholesterol medications.     He denies a history of diabetes mellitus, hypertension and he is never smoked cigarettes.     Family history significant for his mother having \"heart problems\" in her 40s lived until she  in her 70s.  His father  of \"congestive heart failure\" at age 82.     Social history  The patient is retired long for spent with the 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  He is .    Past Medical History:   Diagnosis Date   • Anesthesia     PONV   • Arthritis     generalized   • BPH (benign prostatic hyperplasia)    • Breath shortness    • CAD (coronary artery disease)    • Cataract     left eye,not removed   • Dental disorder     upper denture   • Environmental and seasonal allergies    • Heart burn    • High cholesterol    • Indigestion    • Shortness " of breath    • Wears glasses      Past Surgical History:   Procedure Laterality Date   • TURP-VAPOR  11/8/2018    Procedure: TURP-VAPOR - GREENLIGHT LASER;  Surgeon: Grey Espinoza M.D.;  Location: SURGERY [facility];  Service: Urology   • CERVICAL DISK AND FUSION ANTERIOR  8/11/2016    Procedure: CERVICAL DISK AND FUSION ANTERIOR C3-5 and  Excision of bridging ostephytes C3-4,C4-5,C5-6 ;  Surgeon: Abimael Rock M.D.;  Location: SURGERY [facility];  Service:    • KNEE REPLACEMENT, TOTAL Left 2008    Partial left   • SHOULDER ARTHROSCOPY Left 1990's   • CHOLECYSTECTOMY  1970's   • CERVICAL LAMINECTOMY POSTERIOR     • TONSILLECTOMY       Family History   Problem Relation Age of Onset   • Heart Disease Mother    • Heart Disease Father    • Heart Failure Father      Social History     Socioeconomic History   • Marital status:      Spouse name: Not on file   • Number of children: Not on file   • Years of education: Not on file   • Highest education level: Not on file   Occupational History   • Not on file   Tobacco Use   • Smoking status: Never Smoker   • Smokeless tobacco: Never Used   Vaping Use   • Vaping Use: Never used   Substance and Sexual Activity   • Alcohol use: Not Currently     Alcohol/week: 0.0 oz     Comment: 2 per month   • Drug use: No   • Sexual activity: Not on file   Other Topics Concern   • Not on file   Social History Narrative   • Not on file     Social Determinants of Health     Financial Resource Strain:    • Difficulty of Paying Living Expenses: Not on file   Food Insecurity:    • Worried About Running Out of Food in the Last Year: Not on file   • Ran Out of Food in the Last Year: Not on file   Transportation Needs:    • Lack of Transportation (Medical): Not on file   • Lack of Transportation (Non-Medical): Not on file   Physical Activity:    • Days of Exercise per Week: Not on file   • Minutes of Exercise per Session: Not on file   Stress:    • Feeling of Stress : Not on  file   Social Connections:    • Frequency of Communication with Friends and Family: Not on file   • Frequency of Social Gatherings with Friends and Family: Not on file   • Attends [employer] Services: Not on file   • Active Member of Clubs or Organizations: Not on file   • Attends Club or Organization Meetings: Not on file   • Marital Status: Not on file   Intimate Partner Violence:    • Fear of Current or Ex-Partner: Not on file   • Emotionally Abused: Not on file   • Physically Abused: Not on file   • Sexually Abused: Not on file   Housing Stability:    • Unable to Pay for Housing in the Last Year: Not on file   • Number of Places Lived in the Last Year: Not on file   • Unstable Housing in the Last Year: Not on file     No Known Allergies  Outpatient Encounter Medications as of 1/3/2022   Medication Sig Dispense Refill   • chlordiazePOXIDE (LIBRIUM) 25 MG Cap CHLORDIAZEPOXIDE HCL 25 MG CAPS     • escitalopram (LEXAPRO) 5 MG tablet ESCITALOPRAM OXALATE 5 MG TABS     • tamsulosin (FLOMAX) 0.4 MG capsule      • atorvastatin (LIPITOR) 40 MG Tab Take 1 Tablet by mouth every day. PT NEEDS TO BE SEEN BEFORE ANYMORE REFILLS CAN BE MADE, PLEASE CALL [phone] 30 Tablet 0   • ibuprofen (MOTRIN) 800 MG Tab Take 400 mg by mouth 2 times a day as needed for Mild Pain. 0.5 tablet = 400 mg     • [DISCONTINUED] amoxicillin (AMOXIL) 500 MG Cap amoxicillin 500 mg capsule (Patient not taking: Reported on 1/3/2022)     • [DISCONTINUED] tamsulosin (FLOMAX) 0.4 MG capsule  (Patient not taking: Reported on 1/3/2022)     • [DISCONTINUED] gabapentin (NEURONTIN) 100 MG Cap Take 100 mg by mouth 2 Times a Day.     • [DISCONTINUED] sertraline (ZOLOFT) 25 MG tablet Take  by mouth.     • [DISCONTINUED] LORazepam (ATIVAN) 1 MG Tab      • [DISCONTINUED] ipratropium-albuterol (DUONEB) 0.5-2.5 (3) MG/3ML nebulizer solution USE 1 AMPULE IN NEBULIZER 4 TIMES DAILY AS NEEDED FOR COUGH AND FOR SHORTNESS OF BREATH     • [DISCONTINUED] LORazepam  "(ATIVAN) 0.5 MG Tab Take 0.5 mg by mouth 2 times a day as needed for Anxiety.     • [DISCONTINUED] albuterol 108 (90 Base) MCG/ACT Aero Soln inhalation aerosol Inhale 1-2 Puffs by mouth every four hours as needed (shortness of breath, bronchospasm, wheezing). 1 Inhaler 0   • [DISCONTINUED] aspirin EC (ECOTRIN) 81 MG Tablet Delayed Response Take 81 mg by mouth every evening. (Patient not taking: Reported on 1/3/2022)       No facility-administered encounter medications on file as of 1/3/2022.     Review of Systems   Respiratory: Negative for cough and shortness of breath.    Cardiovascular: Negative for chest pain and palpitations.   Musculoskeletal: Negative for myalgias.   Neurological: Negative for dizziness and loss of consciousness.              Objective     /60 (BP Location: Left arm, Patient Position: Sitting, BP Cuff Size: Adult)   Pulse 70   Ht 1.956 m (6' 5\")   Wt 86.2 kg (190 lb)   BMI 22.53 kg/m²     Physical Exam  Constitutional:       General: He is not in acute distress.  Pulmonary:      Effort: Pulmonary effort is normal.   Neurological:      Mental Status: He is alert and oriented to person, place, and time.   Psychiatric:         Behavior: Behavior normal.         6-minute walk 6/5/2020: Desaturation with 80% at 2 L result on O2.     PFTs 06/06/2016  1.  Baseline spirometry shows normal flows.  2.  There is no significant bronchodilator response.  3.  Lung volumes are within normal limits.  4.  Diffusion capacity is within normal limits.  Overall pulmonary function testing is normal.  This does not rule out reactive airways disease.  Suggest clinical correlation.     EKG 06/06/2019 normal sinus rhythm, rate 68.  Early transition.  Reviewed by myself.     Coronary calcification scan 04/16/2018 coronary calcification score 999.     ECHOCARDIOGRAM 7/1/2020  Technically difficult study incomplete information is obtained  Left ventricular ejection fraction is visually estimated to be " 65-70%.  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  Mild eccentric mitral regurgitation, possible more severe, limited due   to technically limitations.  No prior study is available for comparison.      MPI 7/23/2020   No evidence of significant jeopardized viable myocardium or prior myocardial    infarction.    Normal left ventricular size, ejection fraction, and wall motion.    ECG INTERPRETATION    Negative stress ECG for ischemia.       Assessment & Plan     1. Coronary artery calcification seen on CAT scan     2. Dyslipidemia     3. Mitral valve insufficiency, unspecified etiology         Medical Decision Making: Today's Assessment/Status/Plan:   Assessment  1.  Coronary artery disease as manifest by coronary calcification with an elevated calcium score of 999.  2.  Mitral regurgitation with mild MVP.  3.  Dyslipidemia.     Recommendation Discussion  1.  Currently the patient is clinically doing well regarding his CAD, MVP and dyslipidemia denying any problems with RYA.  Having no symptoms of angina pectoris.  2.  Stopped aspirin due to recent reports of lack of efficacy for primary cardiac prevention though he had been on it for many years with no complications or bleeding problems.  3.  Recent lab work showed LDL at 53; will continue atorvastatin.  4.  RTC 6 months.

## 2022-01-07 DIAGNOSIS — E78.5 DYSLIPIDEMIA: ICD-10-CM

## 2022-01-12 RX ORDER — ATORVASTATIN CALCIUM 40 MG/1
TABLET, FILM COATED ORAL
Qty: 90 TABLET | Refills: 3 | Status: SHIPPED | OUTPATIENT
Start: 2022-01-12 | End: 2024-02-14 | Stop reason: SDUPTHER

## 2022-01-13 RX ORDER — ESCITALOPRAM OXALATE 5 MG/1
TABLET ORAL
Qty: 30 TABLET | Refills: 5 | Status: SHIPPED | OUTPATIENT
Start: 2022-01-13 | End: 2024-02-14

## 2022-01-13 NOTE — TELEPHONE ENCOUNTER
Received request via: Pharmacy    Was the patient seen in the last year in this department? Yes- 02/11/2021    Does the patient have an active prescription (recently filled or refills available) for medication(s) requested? No

## 2022-11-08 ENCOUNTER — PATIENT MESSAGE (OUTPATIENT)
Dept: HEALTH INFORMATION MANAGEMENT | Facility: OTHER | Age: 82
End: 2022-11-08

## 2023-08-03 ENCOUNTER — TELEPHONE (OUTPATIENT)
Dept: CARDIOLOGY | Facility: MEDICAL CENTER | Age: 83
End: 2023-08-03
Payer: MEDICARE

## 2024-01-15 ENCOUNTER — OFFICE VISIT (OUTPATIENT)
Dept: MEDICAL GROUP | Facility: OTHER | Age: 84
End: 2024-01-15
Payer: MEDICARE

## 2024-01-15 VITALS
TEMPERATURE: 97.4 F | WEIGHT: 183.7 LBS | RESPIRATION RATE: 16 BRPM | SYSTOLIC BLOOD PRESSURE: 90 MMHG | HEIGHT: 77 IN | BODY MASS INDEX: 21.69 KG/M2 | DIASTOLIC BLOOD PRESSURE: 58 MMHG

## 2024-01-15 DIAGNOSIS — F32.A DEPRESSION, UNSPECIFIED DEPRESSION TYPE: ICD-10-CM

## 2024-01-15 DIAGNOSIS — R41.3 MEMORY CHANGES: ICD-10-CM

## 2024-01-15 DIAGNOSIS — R63.4 WEIGHT LOSS, ABNORMAL: ICD-10-CM

## 2024-01-15 DIAGNOSIS — R41.3 MEMORY DEFICITS: ICD-10-CM

## 2024-01-15 DIAGNOSIS — Z23 NEED FOR VACCINATION: ICD-10-CM

## 2024-01-15 PROBLEM — N40.1 LOWER URINARY TRACT SYMPTOMS DUE TO BENIGN PROSTATIC HYPERPLASIA: Status: ACTIVE | Noted: 2021-07-29

## 2024-01-15 PROBLEM — M25.559 HIP PAIN: Status: ACTIVE | Noted: 2024-01-15

## 2024-01-15 PROBLEM — M94.252 CHONDROMALACIA OF LEFT HIP: Status: ACTIVE | Noted: 2024-01-15

## 2024-01-15 PROCEDURE — 3074F SYST BP LT 130 MM HG: CPT | Performed by: FAMILY MEDICINE

## 2024-01-15 PROCEDURE — G0008 ADMIN INFLUENZA VIRUS VAC: HCPCS | Performed by: FAMILY MEDICINE

## 2024-01-15 PROCEDURE — 90662 IIV NO PRSV INCREASED AG IM: CPT | Performed by: FAMILY MEDICINE

## 2024-01-15 PROCEDURE — 99214 OFFICE O/P EST MOD 30 MIN: CPT | Mod: 25 | Performed by: FAMILY MEDICINE

## 2024-01-15 PROCEDURE — 3078F DIAST BP <80 MM HG: CPT | Performed by: FAMILY MEDICINE

## 2024-01-15 RX ORDER — LANSOPRAZOLE 30 MG/1
CAPSULE, DELAYED RELEASE ORAL
COMMUNITY

## 2024-01-15 RX ORDER — MIRTAZAPINE 7.5 MG/1
7.5 TABLET, FILM COATED ORAL NIGHTLY
Qty: 30 TABLET | Refills: 5 | Status: SHIPPED | OUTPATIENT
Start: 2024-01-15 | End: 2024-02-14

## 2024-01-15 ASSESSMENT — PATIENT HEALTH QUESTIONNAIRE - PHQ9
SUM OF ALL RESPONSES TO PHQ QUESTIONS 1-9: 18
CLINICAL INTERPRETATION OF PHQ2 SCORE: 6
5. POOR APPETITE OR OVEREATING: 3 - NEARLY EVERY DAY

## 2024-01-15 NOTE — ASSESSMENT & PLAN NOTE
Start Remeron 7.5 mg each evening.   Common adverse reactions of this medication were discussed with patient.   Recheck one month.   Can consider adding SSRI, or adjunct low dose Abilify going forward as needed.  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

## 2024-01-15 NOTE — ASSESSMENT & PLAN NOTE
Baseline labs for cognition plus thyroid for his weight loss.   Declines Neurology consultation.   Suspect mild dementia at baseline, perhaps moderate.
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
## 2024-02-14 ENCOUNTER — OFFICE VISIT (OUTPATIENT)
Dept: MEDICAL GROUP | Facility: OTHER | Age: 84
End: 2024-02-14
Payer: MEDICARE

## 2024-02-14 VITALS
RESPIRATION RATE: 16 BRPM | TEMPERATURE: 97 F | WEIGHT: 188.2 LBS | OXYGEN SATURATION: 100 % | DIASTOLIC BLOOD PRESSURE: 62 MMHG | HEIGHT: 77 IN | SYSTOLIC BLOOD PRESSURE: 92 MMHG | BODY MASS INDEX: 22.22 KG/M2

## 2024-02-14 DIAGNOSIS — E78.5 DYSLIPIDEMIA: ICD-10-CM

## 2024-02-14 DIAGNOSIS — F43.10 PTSD (POST-TRAUMATIC STRESS DISORDER): ICD-10-CM

## 2024-02-14 DIAGNOSIS — F32.A DEPRESSION, UNSPECIFIED DEPRESSION TYPE: ICD-10-CM

## 2024-02-14 PROCEDURE — 3074F SYST BP LT 130 MM HG: CPT | Performed by: FAMILY MEDICINE

## 2024-02-14 PROCEDURE — 3078F DIAST BP <80 MM HG: CPT | Performed by: FAMILY MEDICINE

## 2024-02-14 PROCEDURE — 99214 OFFICE O/P EST MOD 30 MIN: CPT | Performed by: FAMILY MEDICINE

## 2024-02-14 RX ORDER — MIRTAZAPINE 15 MG/1
15 TABLET, FILM COATED ORAL NIGHTLY
Qty: 90 TABLET | Refills: 3 | Status: SHIPPED | OUTPATIENT
Start: 2024-02-14

## 2024-02-14 RX ORDER — ATORVASTATIN CALCIUM 40 MG/1
40 TABLET, FILM COATED ORAL DAILY
Qty: 90 TABLET | Refills: 3 | Status: SHIPPED | OUTPATIENT
Start: 2024-02-14

## 2024-02-14 NOTE — ASSESSMENT & PLAN NOTE
Depression not improved.   Will increase Mirtazapine to 15 mg hs for mood, appetite.   Given names of Sagar Bledsoe and Deacon Schoenberger for counseling therapy.   Consider adjunct therapy though difficult at his age.   Recheck 6 weeks.

## 2024-02-14 NOTE — ASSESSMENT & PLAN NOTE
Recommending some counseling with either Sagar Bledsoe, PH.D or Deacon Shoenberger, Ph.D.   Will continue nightly Mirtazapine at a higher dose.
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
## 2024-03-19 ENCOUNTER — HOSPITAL ENCOUNTER (OUTPATIENT)
Dept: LAB | Facility: MEDICAL CENTER | Age: 84
End: 2024-03-19
Attending: FAMILY MEDICINE
Payer: MEDICARE

## 2024-03-19 DIAGNOSIS — R63.4 WEIGHT LOSS, ABNORMAL: ICD-10-CM

## 2024-03-19 DIAGNOSIS — R41.3 MEMORY CHANGES: ICD-10-CM

## 2024-03-19 DIAGNOSIS — F32.A DEPRESSION, UNSPECIFIED DEPRESSION TYPE: ICD-10-CM

## 2024-03-19 LAB
ANION GAP SERPL CALC-SCNC: 12 MMOL/L (ref 7–16)
BASOPHILS # BLD AUTO: 0.8 % (ref 0–1.8)
BASOPHILS # BLD: 0.05 K/UL (ref 0–0.12)
BUN SERPL-MCNC: 36 MG/DL (ref 8–22)
CALCIUM SERPL-MCNC: 9.4 MG/DL (ref 8.4–10.2)
CHLORIDE SERPL-SCNC: 105 MMOL/L (ref 96–112)
CO2 SERPL-SCNC: 24 MMOL/L (ref 20–33)
CREAT SERPL-MCNC: 1.29 MG/DL (ref 0.5–1.4)
EOSINOPHIL # BLD AUTO: 0.21 K/UL (ref 0–0.51)
EOSINOPHIL NFR BLD: 3.3 % (ref 0–6.9)
ERYTHROCYTE [DISTWIDTH] IN BLOOD BY AUTOMATED COUNT: 43.8 FL (ref 35.9–50)
FASTING STATUS PATIENT QL REPORTED: NORMAL
FOLATE SERPL-MCNC: 11 NG/ML
GFR SERPLBLD CREATININE-BSD FMLA CKD-EPI: 54 ML/MIN/1.73 M 2
GLUCOSE SERPL-MCNC: 85 MG/DL (ref 65–99)
HCT VFR BLD AUTO: 40.4 % (ref 42–52)
HGB BLD-MCNC: 13.9 G/DL (ref 14–18)
IMM GRANULOCYTES # BLD AUTO: 0.03 K/UL (ref 0–0.11)
IMM GRANULOCYTES NFR BLD AUTO: 0.5 % (ref 0–0.9)
LYMPHOCYTES # BLD AUTO: 2.19 K/UL (ref 1–4.8)
LYMPHOCYTES NFR BLD: 34.1 % (ref 22–41)
MCH RBC QN AUTO: 32 PG (ref 27–33)
MCHC RBC AUTO-ENTMCNC: 34.4 G/DL (ref 32.3–36.5)
MCV RBC AUTO: 92.9 FL (ref 81.4–97.8)
MONOCYTES # BLD AUTO: 0.63 K/UL (ref 0–0.85)
MONOCYTES NFR BLD AUTO: 9.8 % (ref 0–13.4)
NEUTROPHILS # BLD AUTO: 3.31 K/UL (ref 1.82–7.42)
NEUTROPHILS NFR BLD: 51.5 % (ref 44–72)
NRBC # BLD AUTO: 0 K/UL
NRBC BLD-RTO: 0 /100 WBC (ref 0–0.2)
PLATELET # BLD AUTO: 259 K/UL (ref 164–446)
PMV BLD AUTO: 10.1 FL (ref 9–12.9)
POTASSIUM SERPL-SCNC: 4 MMOL/L (ref 3.6–5.5)
RBC # BLD AUTO: 4.35 M/UL (ref 4.7–6.1)
SODIUM SERPL-SCNC: 141 MMOL/L (ref 135–145)
TSH SERPL DL<=0.005 MIU/L-ACNC: 2.63 UIU/ML (ref 0.38–5.33)
VIT B12 SERPL-MCNC: 561 PG/ML (ref 211–911)
WBC # BLD AUTO: 6.4 K/UL (ref 4.8–10.8)

## 2024-03-19 PROCEDURE — 82607 VITAMIN B-12: CPT

## 2024-03-19 PROCEDURE — 80048 BASIC METABOLIC PNL TOTAL CA: CPT

## 2024-03-19 PROCEDURE — 85025 COMPLETE CBC W/AUTO DIFF WBC: CPT

## 2024-03-19 PROCEDURE — 82746 ASSAY OF FOLIC ACID SERUM: CPT

## 2024-03-19 PROCEDURE — 36415 COLL VENOUS BLD VENIPUNCTURE: CPT

## 2024-03-19 PROCEDURE — 84443 ASSAY THYROID STIM HORMONE: CPT

## 2025-02-11 ENCOUNTER — APPOINTMENT (OUTPATIENT)
Dept: RADIOLOGY | Facility: MEDICAL CENTER | Age: 85
End: 2025-02-11
Attending: EMERGENCY MEDICINE
Payer: MEDICARE

## 2025-02-11 ENCOUNTER — OFFICE VISIT (OUTPATIENT)
Dept: URGENT CARE | Facility: CLINIC | Age: 85
End: 2025-02-11
Payer: MEDICARE

## 2025-02-11 ENCOUNTER — HOSPITAL ENCOUNTER (EMERGENCY)
Facility: MEDICAL CENTER | Age: 85
End: 2025-02-11
Attending: EMERGENCY MEDICINE
Payer: MEDICARE

## 2025-02-11 VITALS
HEART RATE: 68 BPM | DIASTOLIC BLOOD PRESSURE: 66 MMHG | BODY MASS INDEX: 21.89 KG/M2 | HEIGHT: 77 IN | SYSTOLIC BLOOD PRESSURE: 133 MMHG | WEIGHT: 185.41 LBS | OXYGEN SATURATION: 95 % | TEMPERATURE: 98.8 F | RESPIRATION RATE: 16 BRPM

## 2025-02-11 VITALS
TEMPERATURE: 97.4 F | HEART RATE: 70 BPM | OXYGEN SATURATION: 95 % | RESPIRATION RATE: 9 BRPM | SYSTOLIC BLOOD PRESSURE: 116 MMHG | HEIGHT: 77 IN | WEIGHT: 171.8 LBS | DIASTOLIC BLOOD PRESSURE: 66 MMHG | BODY MASS INDEX: 20.29 KG/M2
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
## 2025-02-11 DIAGNOSIS — T14.8XXA ABRASION: ICD-10-CM

## 2025-02-11 DIAGNOSIS — S63.259A DISLOCATION OF FINGER, INITIAL ENCOUNTER: ICD-10-CM
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
## 2025-02-11 PROCEDURE — 1125F AMNT PAIN NOTED PAIN PRSNT: CPT

## 2025-02-11 PROCEDURE — 73140 X-RAY EXAM OF FINGER(S): CPT | Mod: LT

## 2025-02-11 PROCEDURE — 73130 X-RAY EXAM OF HAND: CPT | Mod: LT

## 2025-02-11 PROCEDURE — 700111 HCHG RX REV CODE 636 W/ 250 OVERRIDE (IP): Performed by: EMERGENCY MEDICINE

## 2025-02-11 PROCEDURE — 3074F SYST BP LT 130 MM HG: CPT

## 2025-02-11 PROCEDURE — 99204 OFFICE O/P NEW MOD 45 MIN: CPT

## 2025-02-11 PROCEDURE — 26770 TREAT FINGER DISLOCATION: CPT

## 2025-02-11 PROCEDURE — 99284 EMERGENCY DEPT VISIT MOD MDM: CPT

## 2025-02-11 PROCEDURE — 3078F DIAST BP <80 MM HG: CPT

## 2025-02-11 RX ORDER — BUPIVACAINE HYDROCHLORIDE 2.5 MG/ML
10 INJECTION, SOLUTION EPIDURAL; INFILTRATION; INTRACAUDAL ONCE
Status: COMPLETED | OUTPATIENT
Start: 2025-02-11 | End: 2025-02-11

## 2025-02-11 RX ADMIN — BUPIVACAINE HYDROCHLORIDE 10 ML: 2.5 INJECTION, SOLUTION EPIDURAL; INFILTRATION; INTRACAUDAL at 18:00

## 2025-02-11 ASSESSMENT — PAIN SCALES - GENERAL: PAINLEVEL_OUTOF10: 5=MODERATE PAIN

## 2025-02-11 ASSESSMENT — ENCOUNTER SYMPTOMS: FALLS: 1

## 2025-02-12 NOTE — ED NOTES
Patient seen by ER physician. Patient agrees with plan of care discussed by Dr. Ly Norton in low position. Side rail up for patient safety. Call light within reach. Plan of care ongoing.

## 2025-02-12 NOTE — ED PROVIDER NOTES
ER Provider Note    Scribed for Bernabe Modi M.d. by Brisa Juárez. 2/11/2025  5:35 PM    Primary Care Provider: Nicola Avendano M.D.    CHIEF COMPLAINT   Chief Complaint   Patient presents with    Fall     Happen 1.5 hours PTA. Pt tripped and landed on his left pinky finger. Pt finger is sideways and states the pain is starting to get unbearable. Pt has not taken anything for the pain.     Digit Pain     EXTERNAL RECORDS REVIEWED  Outpatient Notes urgent care earlier.  Apparently they could not handle it so they sent the patient here.    HPI/ROS  LIMITATION TO HISTORY   Select: : None  OUTSIDE HISTORIAN(S):  None.    Nathan Rose is a 84 y.o. male who presents to the ED for evaluation after a ground level fall onset about 2 hours ago. He describes that he tripped and landed on his left pinky finger. At this time, the finger is sideways and the patient states that the pain is starting to get unbearable. No alleviating or exacerbating factors noted.     PAST MEDICAL HISTORY  Past Medical History:   Diagnosis Date    Anesthesia     PONV    Arthritis     generalized    BPH (benign prostatic hyperplasia)     Breath shortness     CAD (coronary artery disease)     Cataract     left eye,not removed    Dental disorder     upper denture    Environmental and seasonal allergies     Heart burn     High cholesterol     Indigestion     Shortness of breath     Wears glasses        SURGICAL HISTORY  Past Surgical History:   Procedure Laterality Date    TURP-VAPOR  11/8/2018    Procedure: TURP-VAPOR - GREENLIGHT LASER;  Surgeon: Grey Espinoza M.D.;  Location: SURGERY [facility];  Service: Urology    CERVICAL DISK AND FUSION ANTERIOR  8/11/2016    Procedure: CERVICAL DISK AND FUSION ANTERIOR C3-5 and  Excision of bridging ostephytes C3-4,C4-5,C5-6 ;  Surgeon: Abimael Rock M.D.;  Location: SURGERY [facility];  Service:     KNEE REPLACEMENT, TOTAL Left 2008    Partial left     "SHOULDER ARTHROSCOPY Left 1990's    CHOLECYSTECTOMY  1970's    CERVICAL LAMINECTOMY POSTERIOR      TONSILLECTOMY         FAMILY HISTORY  Family History   Problem Relation Age of Onset    Heart Disease Mother     Heart Disease Father     Heart Failure Father        SOCIAL HISTORY   reports that he has never smoked. He has never used smokeless tobacco. He reports that he does not currently use alcohol. He reports that he does not use drugs.      CURRENT MEDICATIONS  Previous Medications    ATORVASTATIN (LIPITOR) 40 MG TAB    Take 1 Tablet by mouth every day. For lowering cholesterol.    CHLORDIAZEPOXIDE (LIBRIUM) 25 MG CAP    CHLORDIAZEPOXIDE HCL 25 MG CAPS    IBUPROFEN (MOTRIN) 800 MG TAB    Take 400 mg by mouth 2 times a day as needed for Mild Pain. 0.5 tablet = 400 mg    LANSOPRAZOLE (PREVACID) 30 MG CAPSULE DELAYED RELEASE        MIRTAZAPINE (REMERON) 15 MG TAB    Take 1 Tablet by mouth every evening. For appetite and mood.    TAMSULOSIN (FLOMAX) 0.4 MG CAPSULE           ALLERGIES  Patient has no known allergies.      PHYSICAL EXAM  BP (!) 146/64   Pulse (!) 55   Temp 37.1 °C (98.8 °F) (Temporal)   Resp 16   Ht 1.956 m (6' 5\")   Wt 84.1 kg (185 lb 6.5 oz)   SpO2 92%   BMI 21.99 kg/m²     Constitutional: Well developed, Well nourished, Mild distress.   HENT: Normocephalic, Atraumatic.  Eyes: Conjunctiva normal, No discharge.   Musculoskeletal: Obvious angulation with abrasion on the medial aspect of his left pinky with about 60° of angulation to the distal finger laterally. Sensation intact.   Skin: Warm, Dry, No erythema, No rash.   Neurologic: Alert & oriented x 3, Normal motor function,  No focal deficits noted.   Psychiatric: Affect normal, Judgment normal, Mood normal.        DIAGNOSTIC STUDIES    RADIOLOGY/PROCEDURES   The attending emergency physician has independently interpreted the diagnostic imaging associated with this visit and am waiting the final reading from the radiologist.   My preliminary " interpretation is a follows: X-ray shows dislocation but no fracture.  Repeat x-ray shows good reduction.    Radiologist interpretation:  DX-FINGER(S) 2+ LEFT   Final Result         1.  Satisfactory reduction of previous dislocation at the left fifth PIP joint.      2. No evidence of osseous fracture.      DX-HAND 3+ LEFT   Final Result         Dislocated fifth PIP joint          Joint Reduction Procedure Note    Indication: Joint dislocation    Consent: The patient was counseled regarding the procedure, it's indications, risks, potential complications and alternatives and any questions were answered. Consent was obtained.    Procedure: The pre-reduction exam showed distal perfusion & neurologic function to be normal. The patient was placed in the sitting position. Anesthesia/pain control was obtained using a digital block of the left fifth (pinkie) finger using 0.5% Bupivacaine without epinephrine. Reduction of the left short (pinkie) finger PIP joint was performed by pulling on it and relocating it. Post reduction films were obtained and revealed satisfactory reduction. A post-reduction exam revealed distal perfusion & neurologic function to be normal. The affected area was immobilized with an aluminum/ foam splint.    The patient tolerated the procedure well.    Complications: None         COURSE & MEDICAL DECISION MAKING     ASSESSMENT, COURSE AND PLAN  Care Narrative:     5:45 PM - Patient seen and examined at bedside. This is a 84 year old man who presents to the emergency department for evaluation after a ground level fall. He presents with an obvious left fifth digit deformity. Discussed plan of care, including performing imaging, treating his pain and reducing it. Patient agrees to the plan of care. Ordered for DX-Hand (Left) to evaluate his symptoms.      5:59 PM - The patient was reevaluated at bedside. Reduction performed by me. See note above.     6:34 PM - The patient was reevaluated at bedside.  Discussed discharge instructions and return precautions with the patient and they were cleared for discharge. Patient was given the opportunity to ask any further questions. He is comfortable with discharge at this time.       PROBLEM LIST  Patient presents with a dislocation after a fall of his left fifth finger.  After a finger block was performed I was able to easily reduce this.  Repeat x-rays do not show any fracture.  He has some superficial abrasions to the fifth finger but no open joint.  Will have him follow-up with orthopedics as needed.    DISPOSITION AND DISCUSSIONS  I have discussed management of the patient with the following physicians and MARY's:  None.    Discussion of management with other [facility] or appropriate source(s): None       Barriers to care at this time, including but not limited to:  None known .     Decision tools and prescription drugs considered including, but not limited to: Pain Medications patient can use Tylenol and ibuprofen for pain .    The patient will return for new or worsening symptoms and is stable at the time of discharge.    DISPOSITION:  Patient will be discharged home in stable condition.    FOLLOW UP:  Nicola Avendano M.D.  [address]   [place] [id number] [phone]    Schedule an appointment as soon as possible for a visit in 1 week      Nicola Nicole M.D.  [address]  [place] [id number] [phone]    Schedule an appointment as soon as possible for a visit       FINAL DIAGNOSIS  1. Dislocation of finger, initial encounter    2. Abrasion    3. Fall, initial encounter       Brisa KANG (Kristina), am scribing for, and in the presence of, Bernabe Modi M.D.    Electronically signed by: Brisa Juárez (Tyeibalfredito), 2/11/2025    IBernabe M.D. personally performed the services described in this documentation, as scribed by Brisa Juárez in my presence, and it is both accurate and  complete.      The note accurately reflects work and decisions made by me.  Bernabe Modi M.D.  2/11/2025  8:02 PM

## 2025-02-12 NOTE — ED TRIAGE NOTES
"Chief Complaint   Patient presents with    Fall     Happen 1.5 hours PTA. Pt tripped and landed on his left pinky finger. Pt finger is sideways and states the pain is starting to get unbearable. Pt has not taken anything for the pain.      BP (!) 146/64   Pulse (!) 55   Temp 37.1 °C (98.8 °F) (Temporal)   Resp 16   Ht 1.956 m (6' 5\")   Wt 84.1 kg (185 lb 6.5 oz)   SpO2 92%   BMI 21.99 kg/m²       "

## 2025-02-12 NOTE — DISCHARGE INSTRUCTIONS
Tylenol and ibuprofen for pain.  Wash your wounds on your finger with soap and water, apply an antibiotic ointment and keep covered.  Use the splint for the next week or 2 to help with pain.  You can follow-up with your primary or with orthopedics.

## 2025-02-12 NOTE — ED NOTES
Discharge instruction provided. Patient verbalized understanding of discharge instruction to follow up with primary care physician and return to the ER if condition worsens. Patient ambulated out of ER without difficulty.
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
## (undated) DEVICE — SYRINGE DISP. 12 CC LL - (100/BX)

## (undated) DEVICE — CONNECTOR HOSE NEPTUNE FOR CYSTO ROOM

## (undated) DEVICE — KIT ROOM DECONTAMINATION

## (undated) DEVICE — CATHETER FOLEY 22 FR 30 CC (12EA/CA)

## (undated) DEVICE — SET LEADWIRE 5 LEAD BEDSIDE DISPOSABLE ECG (1SET OF 5/EA)

## (undated) DEVICE — GOWN SURGEONS LARGE - (32/CA)

## (undated) DEVICE — PACK CYSTOSCOPY III - (8/CA)

## (undated) DEVICE — SPONGE GAUZESTER 4 X 4 4PLY - (128PK/CA)

## (undated) DEVICE — GOWN SURGEONS X-LARGE - DISP. (30/CA)

## (undated) DEVICE — SUCTION INSTRUMENT YANKAUER BULBOUS TIP W/O VENT (50EA/CA)

## (undated) DEVICE — SYRINGE 30 ML LL (56/BX)

## (undated) DEVICE — JELLY, KY 2 0Z STERILE

## (undated) DEVICE — KIT ANESTHESIA W/CIRCUIT & 3/LT BAG W/FILTER (20EA/CA)

## (undated) DEVICE — SENSOR SPO2 NEO LNCS ADHESIVE (20/BX) SEE USER NOTES

## (undated) DEVICE — GLOVE BIOGEL PI INDICATOR SZ 6.5 SURGICAL PF LF - (50/BX 4BX/CA)

## (undated) DEVICE — DETERGENT RENUZYME PLUS 10 OZ PACKET (50/BX)

## (undated) DEVICE — GLOVE BIOGEL SZ 7 SURGICAL PF LTX - (50PR/BX 4BX/CA)

## (undated) DEVICE — BAG DRAINAGE ANTIREFLUX TOWER SLIDE TAP 4000 ML (20EA/CA)

## (undated) DEVICE — ELECTRODE 850 FOAM ADHESIVE - HYDROGEL RADIOTRNSPRNT (50/PK)

## (undated) DEVICE — TUBING CLEARLINK DUO-VENT - C-FLO (48EA/CA)

## (undated) DEVICE — HEAD HOLDER JUNIOR/ADULT

## (undated) DEVICE — TUBE CONNECT SUCTION CLEAR 120 X 1/4" (50EA/CA)"

## (undated) DEVICE — GOWN SURGICAL X-LARGE ULTRA - FILM-REINFORCED (20/CA)

## (undated) DEVICE — SET EXTENSION WITH 2 PORTS (48EA/CA) ***PART #2C8610 IS A SUBSTITUTE*****

## (undated) DEVICE — SET IRRIGATION CYSTOSCOPY Y-TYPE L81 IN (20EA/CA)

## (undated) DEVICE — GLOVE BIOGEL SZ 6 PF LATEX - (50EA/BX 4BX/CA)

## (undated) DEVICE — PROTECTOR ULNA NERVE - (36PR/CA)

## (undated) DEVICE — FIBER GREEN LIGHT SINGLE USE

## (undated) DEVICE — NEPTUNE 4 PORT MANIFOLD - (20/PK)

## (undated) DEVICE — GLOVE BIOGEL SZ 7.5 SURGICAL PF LTX - (50PR/BX 4BX/CA)

## (undated) DEVICE — SODIUM CHL. IRRIGATION 0.9% 3000ML (4EA/CA 65CA/PF)

## (undated) DEVICE — TOWELS CLOTH SURGICAL - (4/PK 20PK/CA)

## (undated) DEVICE — CONTAINER SPECIMEN BAG OR - STERILE 4 OZ W/LID (100EA/CA)

## (undated) DEVICE — LACTATED RINGERS INJ 1000 ML - (14EA/CA 60CA/PF)

## (undated) DEVICE — MASK, LARYNGEAL AIRWAY #5

## (undated) DEVICE — GOWN WARMING STANDARD FLEX - (30/CA)

## (undated) DEVICE — MASK ANESTHESIA ADULT  - (100/CA)